# Patient Record
Sex: MALE | Race: WHITE | NOT HISPANIC OR LATINO | Employment: PART TIME | URBAN - METROPOLITAN AREA
[De-identification: names, ages, dates, MRNs, and addresses within clinical notes are randomized per-mention and may not be internally consistent; named-entity substitution may affect disease eponyms.]

---

## 2021-04-01 ENCOUNTER — CARE COORDINATION (OUTPATIENT)
Dept: CARDIOLOGY | Facility: CLINIC | Age: 51
End: 2021-04-01

## 2021-04-01 NOTE — PROGRESS NOTES
Referral for Outreach Clinic in [place]     Plan: Call pt and set up appt to see Dr. Sahni 4/14/21in [place].    Attempts to contact:  1. April 1, 2021 called VM is full  2. April 2, 2021 - No answer VM is full  3. April 5, 2021 appt scheduled.

## 2021-04-02 PROBLEM — F15.10 METHAMPHETAMINE ABUSE (H): Status: ACTIVE | Noted: 2021-04-02

## 2021-04-02 PROBLEM — I42.8 NICM (NONISCHEMIC CARDIOMYOPATHY) (H): Status: ACTIVE | Noted: 2021-02-21

## 2021-04-02 PROBLEM — E03.9 HYPOTHYROIDISM (ACQUIRED): Status: ACTIVE | Noted: 2018-08-31

## 2021-04-02 PROBLEM — I50.22 CHRONIC SYSTOLIC HEART FAILURE (H): Status: ACTIVE | Noted: 2021-03-01

## 2021-04-02 PROBLEM — E06.3 HASHIMOTO'S THYROIDITIS: Status: ACTIVE | Noted: 2018-08-31

## 2021-04-02 RX ORDER — TRIAMCINOLONE ACETONIDE 55 UG/1
1 SPRAY, METERED NASAL DAILY PRN
COMMUNITY
End: 2022-02-03

## 2021-04-02 RX ORDER — ALBUTEROL SULFATE 90 UG/1
2 AEROSOL, METERED RESPIRATORY (INHALATION) EVERY 4 HOURS PRN
COMMUNITY
End: 2022-08-08

## 2021-04-02 RX ORDER — ISOSORBIDE MONONITRATE 30 MG/1
0.5 TABLET, EXTENDED RELEASE ORAL DAILY
COMMUNITY
Start: 2021-03-02 | End: 2022-08-08

## 2021-04-02 RX ORDER — LEVOTHYROXINE SODIUM 175 UG/1
175 TABLET ORAL DAILY
COMMUNITY
Start: 2021-02-22 | End: 2022-02-03

## 2021-04-02 RX ORDER — FUROSEMIDE 40 MG
40 TABLET ORAL DAILY
COMMUNITY
Start: 2021-03-15 | End: 2022-02-03

## 2021-04-02 RX ORDER — METOPROLOL SUCCINATE 50 MG/1
50 TABLET, EXTENDED RELEASE ORAL 2 TIMES DAILY
COMMUNITY
Start: 2021-03-02 | End: 2022-08-08

## 2021-04-13 NOTE — PROGRESS NOTES
April 12, 2021     Dear Dr. Galdamez and Chetna,  I had the pleasure of seeing Nicolas Bustos in clinic today. As you know he is a 50yr old male with history of HFrEF, NICM, mild non-obstructive CAD and hypothyroidism. He was initially diagnosed with HF in 11/2019. EF at that time was 25-30% and angiogram showed minimal, non-obstructive CAD. EF remained low at 30%. ICD was apparently declined by patient at the time. Then he was admitted in 02/2021 with HF exacerbation secondary to non-compliance and being off medications for the previous 6 months. Echo showed LVEF of 30% and he was diuresed from 216 lbs to 206 lbs. Then he was readmitted 2/28-3/2/2021 with HF exacerbation. Was again diuresed from a weight of 215 lbs down to 203 lbs at the time of discharge. Repeat echo at he time showed further decrement in EF down to 10-15%. HF medications were restarted. Of note, UDS was positive for methamphetamines. He was seen in clinic recently and noted advanced Class III symptoms at the time in addition to bloating, fatigue and VEGAS to 1/2 block at the time. Weight was up to 214 lbs. He was scheduled to undergo ICD implantation on 4/13/21 yet this was postponed due to having been diagnosed with PNA and green sputum and with increased SOB. He was reportedly COVID negative in this setting. He is in the clinic to establish care for advanced heart failure.      Past Medical History:     CHF (congestive heart failure) ([facility]) 2019     Coronary artery disease     Hashimoto's disease     Hypothyroidism     Social History   Socioeconomic History     Marital status:    Spouse name: Quita Bustos     Number of children: Not on file     Years of education: Not on file     Highest education level: Not on file   Occupational History     Not on file   Social Needs     Financial resource strain: Not on file     Food insecurity   Worry: Not on file   Inability: Not on file     Transportation needs   Medical: Not on file    Non-medical: Not on file   Tobacco Use     Smoking status: Never Smoker     Smokeless tobacco: Current User   Types: Chew   Substance and Sexual Activity     Alcohol use: Yes   Frequency: Monthly or less   Drinks per session: 1 or 2   Binge frequency: Less than monthly     Drug use: Never     Sexual activity: Yes   Partners: Female   Lifestyle     Physical activity   Days per week: Not on file   Minutes per session: Not on file     Stress: Not on file   Relationships     Social connections   Talks on phone: Not on file   Gets together: Not on file   Attends Taoism service: Not on file   Active member of club or organization: Not on file   Attends meetings of clubs or organizations: Not on file   Relationship status: Not on file     Intimate partner violence   Fear of current or ex partner: Not on file   Emotionally abused: Not on file   Physically abused: Not on file   Forced sexual activity: Not on file   Other Topics Concern     Not on file   Social History Narrative     Not on file     Family History   Problem Relation Age of Onset     No Known Problems Mother     No Known Problems Father     Heart Disease Brother     Heart Disease Maternal Grandfather     CURRENT MEDICATIONS:  Current Outpatient Medications   Medication     albuterol (PROAIR HFA/PROVENTIL HFA/VENTOLIN HFA) 108 (90 Base) MCG/ACT inhaler     furosemide (LASIX) 40 MG tablet     isosorbide mononitrate (IMDUR) 30 MG 24 hr tablet     levothyroxine (SYNTHROID/LEVOTHROID) 175 MCG tablet     metoprolol succinate ER (TOPROL-XL) 50 MG 24 hr tablet     sacubitril-valsartan (ENTRESTO) 49-51 MG per tablet     triamcinolone (NASACORT) 55 MCG/ACT nasal aerosol     No current facility-administered medications for this visit.      ROS:   Constitutional: No fever, chills, or sweats.   ENT: No visual disturbance, ear ache, epistaxis, sore throat.   Allergies/Immunologic: Negative.   Respiratory: No cough, hemoptysis.   Cardiovascular: As per HPI.   GI: No  nausea, vomiting, hematemesis, melena, or hematochezia.   : No urinary frequency, dysuria, or hematuria.   Integument: Negative.   Psychiatric: Pleasant, no major depression noted  Neuro: No focal neurological deficits noted  Endocrinology: Negative.   Musculoskeletal: As per HPI.      EXAM:    General: appears comfortable, alert and oriented  Head: normocephalic, atraumatic  Eyes: anicteric sclera, EOMI , PERRL  Neck: no adenopathy  Orophyarynx: moist mucosa, no lesions noted  Heart: regular, S1/S2, no murmurs, rubs or gallop. Estimated JVP at 5 cmH2O  Lungs: CTAB, No wheezing.   Abdomen: soft, non-tender, bowel sounds present, no hepatosplenomegaly  Extremities: No LE edema today  Skin: no open lesions noted  Neuro: grossly non-focal     Labs:    Echo 2019: LVEF 25-30%; LVED 5.4 cm; 2+ MR  Echo 2019: LVEF 30-35%; LVEDd 5.8 cm; 2+ MR  Echo 2021: LVEF 30%, moderate MR  Echo 3/1/2021: LVEF 10-15%; LVEDd 6.2 cm; moderate MR   Left Ventricle: Severely reduced left ventricular systolic function.  The EF is visually estimated to be 10-15%.     Left Ventricle: There is mild concentric hypertrophy.     Right Ventricle: The right ventricle is mildly dilated.     Mitral Valve: There is moderate regurgitation.     Tricuspid Valve: There is mild regurgitation. RVSP measurin.8     Coronary angiogram 2019:  Minimal non-obstructive coronary artery disease  Non-ischemic cardiomyopathy     ASSESSMENT AND PLAN:  In summary, patient is a 50 year old male with above past medical history including NICM of unclear etiology, possibly related to metamphetamine use as well as history of medication non adherence who was referred to the HF clinc to establish care given advanced Class III symptoms and worsening EF at this time.    1. Chronic systolic heart failure. NICM. NYHA FC IIIb.   Beta-blocker: Continue Toprol XL 50 mg daily. Will up-titrate as tolerated. Plan to titrate ACEi first  ACEi: Entresto 49/51mg po  BID. Also has a cough (could be hypervolemia related) but if he needs to stop the entresto due to cost, recommend losartan then.   Aldosterone antagonist: None. Add once ACEi/BB better optimized   Volume status:   Diuretic regimen: lasix 80mg po daily. Labs and then likely will increase to BID.   Rhythm: Recent EKG with SR  Device therapy: pending  Ischemic assessment: Angiogram 11/2019 showed minimal non-obstructive CAD (10% pLAD, 20% p1st Diag, otherwise patent coronaries). Treat medically   Advanced therapies:      2. Mild non-obstructive CAD. Treat medically.   3. Hypothyroidism. On levothyroxine. Check labs.  4. Elevated LFTs. Noted on first admission in 02/2021, likely secondary to hepatic congestion vs low flow. Improving on subsequent labs. Will follow.     Educated on low sodium diet, daily weights, and exercise. Educated to call the HF Clinic for weight gain of 3-4 lbs overnight or 5 lbs in 1 week or increase shortness of breath or swelling. Diet and lifestyle modifications were discussed with the patient and a follow up plan is in place.            I appreciate the opportunity to participate in the care of Nicolas Bustos . Please do not hesitate to contact me with any further questions.    Sincerely,   Renzo Sahni MD     [facility] Division of Cardiology

## 2021-04-14 ENCOUNTER — OFFICE VISIT (OUTPATIENT)
Dept: CARDIOLOGY | Facility: OTHER | Age: 51
End: 2021-04-14
Payer: COMMERCIAL

## 2021-04-14 ENCOUNTER — VIRTUAL VISIT (OUTPATIENT)
Dept: CARDIOLOGY | Facility: OTHER | Age: 51
End: 2021-04-14
Payer: COMMERCIAL

## 2021-04-14 DIAGNOSIS — E78.2 MIXED HYPERLIPIDEMIA: ICD-10-CM

## 2021-04-14 DIAGNOSIS — I50.20 HEART FAILURE WITH REDUCED EJECTION FRACTION, NYHA CLASS III (H): Primary | ICD-10-CM

## 2021-04-14 DIAGNOSIS — I42.8 NONISCHEMIC CARDIOMYOPATHY (H): ICD-10-CM

## 2021-04-14 DIAGNOSIS — I10 BENIGN ESSENTIAL HYPERTENSION: ICD-10-CM

## 2021-04-14 DIAGNOSIS — Z53.9 ERRONEOUS ENCOUNTER--DISREGARD: Primary | ICD-10-CM

## 2021-04-14 PROCEDURE — 99204 OFFICE O/P NEW MOD 45 MIN: CPT | Mod: 95 | Performed by: INTERNAL MEDICINE

## 2021-04-14 NOTE — NURSING NOTE
Chief Complaint   Patient presents with     New Patient     New Heart Failure      Medications were reviewed.     Ayan Blanco CMA  Heart Failure, Advanced Heart Failure & CORE  Referral Specialist &     [facility]  Cardiology  Office: [phone]  [phone]

## 2021-04-14 NOTE — LETTER
4/14/2021      RE: Nicolas Bustos  [address] [place] [id number]       Dear Colleague,    Thank you for the opportunity to participate in the care of your patient, Nicolas Bustos, at the [facility] [place] at . Please see a copy of my visit note below.    April 12, 2021     Dear Dr. Galdamez and Chetna,  I had the pleasure of seeing Nicolas Bustos in clinic today. As you know he is a 50yr old male with history of HFrEF, NICM, mild non-obstructive CAD and hypothyroidism. He was initially diagnosed with HF in 11/2019. EF at that time was 25-30% and angiogram showed minimal, non-obstructive CAD. EF remained low at 30%. ICD was apparently declined by patient at the time. Then he was admitted in 02/2021 with HF exacerbation secondary to non-compliance and being off medications for the previous 6 months. Echo showed LVEF of 30% and he was diuresed from 216 lbs to 206 lbs. Then he was readmitted 2/28-3/2/2021 with HF exacerbation. Was again diuresed from a weight of 215 lbs down to 203 lbs at the time of discharge. Repeat echo at he time showed further decrement in EF down to 10-15%. HF medications were restarted. Of note, UDS was positive for methamphetamines. He was seen in clinic recently and noted advanced Class III symptoms at the time in addition to bloating, fatigue and VEGAS to 1/2 block at the time. Weight was up to 214 lbs. He was scheduled to undergo ICD implantation on 4/13/21 yet this was postponed due to having been diagnosed with PNA and green sputum and with increased SOB. He was reportedly COVID negative in this setting. He is in the clinic to establish care for advanced heart failure.      Past Medical History:     CHF (congestive heart failure) ([facility]) 2019     Coronary artery disease     Hashimoto's disease     Hypothyroidism     Social History   Socioeconomic History     Marital status:    Spouse name:  Quita Bustos     Number of children: Not on file     Years of education: Not on file     Highest education level: Not on file   Occupational History     Not on file   Social Needs     Financial resource strain: Not on file     Food insecurity   Worry: Not on file   Inability: Not on file     Transportation needs   Medical: Not on file   Non-medical: Not on file   Tobacco Use     Smoking status: Never Smoker     Smokeless tobacco: Current User   Types: Chew   Substance and Sexual Activity     Alcohol use: Yes   Frequency: Monthly or less   Drinks per session: 1 or 2   Binge frequency: Less than monthly     Drug use: Never     Sexual activity: Yes   Partners: Female   Lifestyle     Physical activity   Days per week: Not on file   Minutes per session: Not on file     Stress: Not on file   Relationships     Social connections   Talks on phone: Not on file   Gets together: Not on file   Attends Yarsanism service: Not on file   Active member of club or organization: Not on file   Attends meetings of clubs or organizations: Not on file   Relationship status: Not on file     Intimate partner violence   Fear of current or ex partner: Not on file   Emotionally abused: Not on file   Physically abused: Not on file   Forced sexual activity: Not on file   Other Topics Concern     Not on file   Social History Narrative     Not on file     Family History   Problem Relation Age of Onset     No Known Problems Mother     No Known Problems Father     Heart Disease Brother     Heart Disease Maternal Grandfather     CURRENT MEDICATIONS:  Current Outpatient Medications   Medication     albuterol (PROAIR HFA/PROVENTIL HFA/VENTOLIN HFA) 108 (90 Base) MCG/ACT inhaler     furosemide (LASIX) 40 MG tablet     isosorbide mononitrate (IMDUR) 30 MG 24 hr tablet     levothyroxine (SYNTHROID/LEVOTHROID) 175 MCG tablet     metoprolol succinate ER (TOPROL-XL) 50 MG 24 hr tablet     sacubitril-valsartan (ENTRESTO) 49-51 MG per tablet      triamcinolone (NASACORT) 55 MCG/ACT nasal aerosol     No current facility-administered medications for this visit.      ROS:   Constitutional: No fever, chills, or sweats.   ENT: No visual disturbance, ear ache, epistaxis, sore throat.   Allergies/Immunologic: Negative.   Respiratory: No cough, hemoptysis.   Cardiovascular: As per HPI.   GI: No nausea, vomiting, hematemesis, melena, or hematochezia.   : No urinary frequency, dysuria, or hematuria.   Integument: Negative.   Psychiatric: Pleasant, no major depression noted  Neuro: No focal neurological deficits noted  Endocrinology: Negative.   Musculoskeletal: As per HPI.      EXAM:    General: appears comfortable, alert and oriented  Head: normocephalic, atraumatic  Eyes: anicteric sclera, EOMI , PERRL  Neck: no adenopathy  Orophyarynx: moist mucosa, no lesions noted  Heart: regular, S1/S2, no murmurs, rubs or gallop. Estimated JVP at 5 cmH2O  Lungs: CTAB, No wheezing.   Abdomen: soft, non-tender, bowel sounds present, no hepatosplenomegaly  Extremities: No LE edema today  Skin: no open lesions noted  Neuro: grossly non-focal     Labs:    Echo 2019: LVEF 25-30%; LVED 5.4 cm; 2+ MR  Echo 2019: LVEF 30-35%; LVEDd 5.8 cm; 2+ MR  Echo 2021: LVEF 30%, moderate MR  Echo 3/1/2021: LVEF 10-15%; LVEDd 6.2 cm; moderate MR   Left Ventricle: Severely reduced left ventricular systolic function.  The EF is visually estimated to be 10-15%.     Left Ventricle: There is mild concentric hypertrophy.     Right Ventricle: The right ventricle is mildly dilated.     Mitral Valve: There is moderate regurgitation.     Tricuspid Valve: There is mild regurgitation. RVSP measurin.8     Coronary angiogram 2019:  Minimal non-obstructive coronary artery disease  Non-ischemic cardiomyopathy     ASSESSMENT AND PLAN:  In summary, patient is a 50 year old male with above past medical history including NICM of unclear etiology, possibly related to metamphetamine use as well  as history of medication non adherence who was referred to the HF clinc to establish care given advanced Class III symptoms and worsening EF at this time.    1. Chronic systolic heart failure. NICM. NYHA FC IIIb.   Beta-blocker: Continue Toprol XL 50 mg daily. Will up-titrate as tolerated. Plan to titrate ACEi first  ACEi: Entresto 49/51mg po BID. Also has a cough (could be hypervolemia related) but if he needs to stop the entresto due to cost, recommend losartan then.   Aldosterone antagonist: None. Add once ACEi/BB better optimized   Volume status:   Diuretic regimen: lasix 80mg po daily. Labs and then likely will increase to BID.   Rhythm: Recent EKG with SR  Device therapy: pending  Ischemic assessment: Angiogram 11/2019 showed minimal non-obstructive CAD (10% pLAD, 20% p1st Diag, otherwise patent coronaries). Treat medically   Advanced therapies:      2. Mild non-obstructive CAD. Treat medically.   3. Hypothyroidism. On levothyroxine. Check labs.  4. Elevated LFTs. Noted on first admission in 02/2021, likely secondary to hepatic congestion vs low flow. Improving on subsequent labs. Will follow.     Educated on low sodium diet, daily weights, and exercise. Educated to call the HF Clinic for weight gain of 3-4 lbs overnight or 5 lbs in 1 week or increase shortness of breath or swelling. Diet and lifestyle modifications were discussed with the patient and a follow up plan is in place.            I appreciate the opportunity to participate in the care of Nicolas Bustos . Please do not hesitate to contact me with any further questions.    Sincerely,   Renzo Sahni MD     [facility] Division of Cardiology           This encounter was opened in error. Please disregard.      Please do not hesitate to contact me if you have any questions/concerns.     Sincerely,     Renzo Sahni MD

## 2021-04-14 NOTE — PROGRESS NOTES
This was a phone encounter as patient was unable to make the phone agreement work.  Phone duration 17 minutes    April 14, 2021  Dear Dr. Galdamez and Nicolas Basilio Akash is a 50yr old male with history of HFrEF, NICM, mild non-obstructive CAD and hypothyroidism. He was initially diagnosed with HF in 11/2019. EF at that time was 25-30% and angiogram showed minimal, non-obstructive CAD. EF remained low at 30%. ICD was apparently declined by patient at the time. Then he was admitted in 02/2021 with HF exacerbation secondary to non-compliance and being off medications for the previous 6 months. Echo showed LVEF of 30% and he was diuresed from 216 lbs to 206 lbs. Then he was readmitted 2/28-3/2/2021 with HF exacerbation. Was again diuresed from a weight of 215 lbs down to 203 lbs at the time of discharge. Repeat echo at he time showed further decrement in EF down to 10-15%. HF medications were restarted. Of note, UDS was positive for methamphetamines. He was seen in clinic recently and noted advanced Class III symptoms at the time in addition to bloating, fatigue and VEGAS to 1/2 block at the time. Weight was up to 214 lbs. He was scheduled to undergo ICD implantation on 4/13/21 yet this was postponed due to having been diagnosed with PNA and green sputum and with increased SOB. He was reportedly COVID negative in this setting.  I am talking to him over the phone today.  Note that he is not doing very well especially in the setting of pneumonia.  He continues to have some productive cough and he finishes his antibiotic today.  He feels a little bit better however he still very short of breath.  He says his weight is about 210 to 214 pounds which is significantly above his baseline but less than when he was hospitalized.  He claims good medication compliance.  He also notes that he has been tested for Covid which was negative.  No other complaints    Past Medical History:     CHF (congestive heart failure) ([facility])  2019     Coronary artery disease     Hashimoto's disease     Hypothyroidism     Social History   Socioeconomic History     Marital status:    Spouse name: Quita Bustos     Number of children: Not on file     Years of education: Not on file     Highest education level: Not on file   Occupational History     Not on file   Social Needs     Financial resource strain: Not on file     Food insecurity   Worry: Not on file   Inability: Not on file     Transportation needs   Medical: Not on file   Non-medical: Not on file   Tobacco Use     Smoking status: Never Smoker     Smokeless tobacco: Current User   Types: Chew   Substance and Sexual Activity     Alcohol use: Yes   Frequency: Monthly or less   Drinks per session: 1 or 2   Binge frequency: Less than monthly     Drug use: Never     Sexual activity: Yes   Partners: Female   Lifestyle     Physical activity   Days per week: Not on file   Minutes per session: Not on file     Stress: Not on file   Relationships     Social connections   Talks on phone: Not on file   Gets together: Not on file   Attends Episcopalian service: Not on file   Active member of club or organization: Not on file   Attends meetings of clubs or organizations: Not on file   Relationship status: Not on file     Intimate partner violence   Fear of current or ex partner: Not on file   Emotionally abused: Not on file   Physically abused: Not on file   Forced sexual activity: Not on file   Other Topics Concern     Not on file   Social History Narrative     Not on file     Family History   Problem Relation Age of Onset     No Known Problems Mother     No Known Problems Father     Heart Disease Brother     Heart Disease Maternal Grandfather     CURRENT MEDICATIONS:  Current Outpatient Medications   Medication     albuterol (PROAIR HFA/PROVENTIL HFA/VENTOLIN HFA) 108 (90 Base) MCG/ACT inhaler     furosemide (LASIX) 40 MG tablet     isosorbide mononitrate (IMDUR) 30 MG 24 hr tablet     levothyroxine  (SYNTHROID/LEVOTHROID) 175 MCG tablet     metoprolol succinate ER (TOPROL-XL) 50 MG 24 hr tablet     sacubitril-valsartan (ENTRESTO) 49-51 MG per tablet     triamcinolone (NASACORT) 55 MCG/ACT nasal aerosol     No current facility-administered medications for this visit.      ROS:   Constitutional: No fever, chills, or sweats.   ENT: No visual disturbance, ear ache, epistaxis, sore throat.   Allergies/Immunologic: Negative.   Respiratory: No cough, hemoptysis.   Cardiovascular: As per HPI.   GI: No nausea, vomiting, hematemesis, melena, or hematochezia.   : No urinary frequency, dysuria, or hematuria.   Integument: Negative.   Psychiatric: Pleasant, no major depression noted  Neuro: No focal neurological deficits noted  Endocrinology: Negative.   Musculoskeletal: As per HPI.      EXAM:  Unable to fully evaluate the patient given that this was a phone encounter.  Vitals are unknown to patient.     Labs: reviewed  Echo 2019: LVEF 25-30%; LVED 5.4 cm; 2+ MR  Echo 2019: LVEF 30-35%; LVEDd 5.8 cm; 2+ MR  Echo 2021: LVEF 30%, moderate MR  Echo 3/1/2021: LVEF 10-15%; LVEDd 6.2 cm; moderate MR   Left Ventricle: Severely reduced left ventricular systolic function.  The EF is visually estimated to be 10-15%.     Left Ventricle: There is mild concentric hypertrophy.     Right Ventricle: The right ventricle is mildly dilated.     Mitral Valve: There is moderate regurgitation.     Tricuspid Valve: There is mild regurgitation. RVSP measurin.8     Coronary angiogram 2019:  Minimal non-obstructive coronary artery disease  Non-ischemic cardiomyopathy    ASSESSMENT AND PLAN:  In summary, patient is a 50 year old male with above past medical history including NICM of unclear etiology, possibly related to metamphetamine use as well as history of medication non adherence who was referred to the HF clinc to establish care given advanced Class III symptoms and worsening EF at this time.  I think he has multiple  things going on even though his Covid test was negative upon just talking to him I am not sure if it is really negative and I would suggest follow-up Covid test for him.  I am not sure the antibiotic have him tremendously but he notes that he feels a little bit better.  Given that he is has ongoing cough and weight gain I suggested that he increase his Lasix to 80 mg in the morning of continue 40 mg in the afternoon for at least 3 days and see how he is doing.  If he is doing better with this then we might suggest 2 weeks standard take the 80 mg / 40 mg for longer time and see how he is doing.  Given that I was unable to see him this time I discussed with him that I will see him next time when I am here back in June or so.  Obviously seizing illicit use would be of critical importance in order to help him recover his ejection fraction.    1. Chronic systolic heart failure. NICM. NYHA FC IIIb.   Beta-blocker: Continue Toprol XL 50 mg daily. Will up-titrate as tolerated. Plan to titrate ACEi first  ACEi: Entresto 49/51mg po BID. Also has a cough (could be hypervolemia related) but if he needs to stop the entresto due to cost, recommend losartan then.   Aldosterone antagonist: None. Add once ACEi/BB better optimized   Volume status: Sounds mildly volume overloaded  Diuretic regimen: Recommended to increase dose to 80 mg in the morning and 40 mg in the afternoon for about next couple of days and then continue 40 mg twice daily.  Might need to increase it permanently  Rhythm: Recent EKG with SR  Device therapy: pending  Ischemic assessment: Angiogram 11/2019 showed minimal non-obstructive CAD (10% pLAD, 20% p1st Diag, otherwise patent coronaries). Treat medically   Advanced therapies:      2. Mild non-obstructive CAD. Treat medically.   3. Hypothyroidism. On levothyroxine. Check labs.  4. Elevated LFTs. Noted on first admission in 02/2021, likely secondary to hepatic congestion vs low flow. Improving on subsequent labs.  Will follow.     I appreciate the opportunity to participate in the care of Nicolas Bustos . Please do not hesitate to contact me with any further questions.    Sincerely,   Renzo Sahni MD     [facility] Division of Cardiology

## 2021-04-14 NOTE — LETTER
4/14/2021      RE: Nicolas Bustos  [address] [facility] [address]       Dear Colleague,    Thank you for the opportunity to participate in the care of your patient, Nicolas Bustos, at the [facility] HEART SERVICES [place] at [facility]. Please see a copy of my visit note below.    This was a phone encounter as patient was unable to make the phone agreement work.  Phone duration 17 minutes    April 14, 2021  Dear Dr. Galdamez and Chetna  Nicolas Bustos is a 50yr old male with history of HFrEF, NICM, mild non-obstructive CAD and hypothyroidism. He was initially diagnosed with HF in 11/2019. EF at that time was 25-30% and angiogram showed minimal, non-obstructive CAD. EF remained low at 30%. ICD was apparently declined by patient at the time. Then he was admitted in 02/2021 with HF exacerbation secondary to non-compliance and being off medications for the previous 6 months. Echo showed LVEF of 30% and he was diuresed from 216 lbs to 206 lbs. Then he was readmitted 2/28-3/2/2021 with HF exacerbation. Was again diuresed from a weight of 215 lbs down to 203 lbs at the time of discharge. Repeat echo at he time showed further decrement in EF down to 10-15%. HF medications were restarted. Of note, UDS was positive for methamphetamines. He was seen in clinic recently and noted advanced Class III symptoms at the time in addition to bloating, fatigue and VEGAS to 1/2 block at the time. Weight was up to 214 lbs. He was scheduled to undergo ICD implantation on 4/13/21 yet this was postponed due to having been diagnosed with PNA and green sputum and with increased SOB. He was reportedly COVID negative in this setting.  I am talking to him over the phone today.  Note that he is not doing very well especially in the setting of pneumonia.  He continues to have some productive cough and he finishes his antibiotic today.  He feels a little bit better however he still  very short of breath.  He says his weight is about 210 to 214 pounds which is significantly above his baseline but less than when he was hospitalized.  He claims good medication compliance.  He also notes that he has been tested for Covid which was negative.  No other complaints    Past Medical History:     CHF (congestive heart failure) ([facility]) 2019     Coronary artery disease     Hashimoto's disease     Hypothyroidism     Social History   Socioeconomic History     Marital status:    Spouse name: Quita Bustos     Number of children: Not on file     Years of education: Not on file     Highest education level: Not on file   Occupational History     Not on file   Social Needs     Financial resource strain: Not on file     Food insecurity   Worry: Not on file   Inability: Not on file     Transportation needs   Medical: Not on file   Non-medical: Not on file   Tobacco Use     Smoking status: Never Smoker     Smokeless tobacco: Current User   Types: Chew   Substance and Sexual Activity     Alcohol use: Yes   Frequency: Monthly or less   Drinks per session: 1 or 2   Binge frequency: Less than monthly     Drug use: Never     Sexual activity: Yes   Partners: Female   Lifestyle     Physical activity   Days per week: Not on file   Minutes per session: Not on file     Stress: Not on file   Relationships     Social connections   Talks on phone: Not on file   Gets together: Not on file   Attends Jew service: Not on file   Active member of club or organization: Not on file   Attends meetings of clubs or organizations: Not on file   Relationship status: Not on file     Intimate partner violence   Fear of current or ex partner: Not on file   Emotionally abused: Not on file   Physically abused: Not on file   Forced sexual activity: Not on file   Other Topics Concern     Not on file   Social History Narrative     Not on file     Family History   Problem Relation Age of Onset     No Known Problems Mother     No Known  Problems Father     Heart Disease Brother     Heart Disease Maternal Grandfather     CURRENT MEDICATIONS:  Current Outpatient Medications   Medication     albuterol (PROAIR HFA/PROVENTIL HFA/VENTOLIN HFA) 108 (90 Base) MCG/ACT inhaler     furosemide (LASIX) 40 MG tablet     isosorbide mononitrate (IMDUR) 30 MG 24 hr tablet     levothyroxine (SYNTHROID/LEVOTHROID) 175 MCG tablet     metoprolol succinate ER (TOPROL-XL) 50 MG 24 hr tablet     sacubitril-valsartan (ENTRESTO) 49-51 MG per tablet     triamcinolone (NASACORT) 55 MCG/ACT nasal aerosol     No current facility-administered medications for this visit.      ROS:   Constitutional: No fever, chills, or sweats.   ENT: No visual disturbance, ear ache, epistaxis, sore throat.   Allergies/Immunologic: Negative.   Respiratory: No cough, hemoptysis.   Cardiovascular: As per HPI.   GI: No nausea, vomiting, hematemesis, melena, or hematochezia.   : No urinary frequency, dysuria, or hematuria.   Integument: Negative.   Psychiatric: Pleasant, no major depression noted  Neuro: No focal neurological deficits noted  Endocrinology: Negative.   Musculoskeletal: As per HPI.      EXAM:  Unable to fully evaluate the patient given that this was a phone encounter.  Vitals are unknown to patient.     Labs: reviewed  Echo 2019: LVEF 25-30%; LVED 5.4 cm; 2+ MR  Echo 2019: LVEF 30-35%; LVEDd 5.8 cm; 2+ MR  Echo 2021: LVEF 30%, moderate MR  Echo 3/1/2021: LVEF 10-15%; LVEDd 6.2 cm; moderate MR   Left Ventricle: Severely reduced left ventricular systolic function.  The EF is visually estimated to be 10-15%.     Left Ventricle: There is mild concentric hypertrophy.     Right Ventricle: The right ventricle is mildly dilated.     Mitral Valve: There is moderate regurgitation.     Tricuspid Valve: There is mild regurgitation. RVSP measurin.8     Coronary angiogram 2019:  Minimal non-obstructive coronary artery disease  Non-ischemic cardiomyopathy    ASSESSMENT AND  PLAN:  In summary, patient is a 50 year old male with above past medical history including NICM of unclear etiology, possibly related to metamphetamine use as well as history of medication non adherence who was referred to the HF clinc to establish care given advanced Class III symptoms and worsening EF at this time.  I think he has multiple things going on even though his Covid test was negative upon just talking to him I am not sure if it is really negative and I would suggest follow-up Covid test for him.  I am not sure the antibiotic have him tremendously but he notes that he feels a little bit better.  Given that he is has ongoing cough and weight gain I suggested that he increase his Lasix to 80 mg in the morning of continue 40 mg in the afternoon for at least 3 days and see how he is doing.  If he is doing better with this then we might suggest 2 weeks standard take the 80 mg / 40 mg for longer time and see how he is doing.  Given that I was unable to see him this time I discussed with him that I will see him next time when I am here back in June or so.  Obviously seizing illicit use would be of critical importance in order to help him recover his ejection fraction.    1. Chronic systolic heart failure. NICM. NYHA FC IIIb.   Beta-blocker: Continue Toprol XL 50 mg daily. Will up-titrate as tolerated. Plan to titrate ACEi first  ACEi: Entresto 49/51mg po BID. Also has a cough (could be hypervolemia related) but if he needs to stop the entresto due to cost, recommend losartan then.   Aldosterone antagonist: None. Add once ACEi/BB better optimized   Volume status: Sounds mildly volume overloaded  Diuretic regimen: Recommended to increase dose to 80 mg in the morning and 40 mg in the afternoon for about next couple of days and then continue 40 mg twice daily.  Might need to increase it permanently  Rhythm: Recent EKG with SR  Device therapy: pending  Ischemic assessment: Angiogram 11/2019 showed minimal  non-obstructive CAD (10% pLAD, 20% p1st Diag, otherwise patent coronaries). Treat medically   Advanced therapies:      2. Mild non-obstructive CAD. Treat medically.   3. Hypothyroidism. On levothyroxine. Check labs.  4. Elevated LFTs. Noted on first admission in 02/2021, likely secondary to hepatic congestion vs low flow. Improving on subsequent labs. Will follow.     I appreciate the opportunity to participate in the care of Nicolas Bustos . Please do not hesitate to contact me with any further questions.    Sincerely,   Renzo Sahni MD     [facility] Division of Cardiology

## 2022-02-03 RX ORDER — MILRINONE LACTATE 0.2 MG/ML
49.2 INJECTION, SOLUTION INTRAVENOUS
COMMUNITY
Start: 2022-02-03 | End: 2022-04-08

## 2022-02-03 RX ORDER — DIGOXIN 250 MCG
0.25 TABLET ORAL
COMMUNITY
Start: 2022-02-03 | End: 2022-05-04

## 2022-02-03 RX ORDER — FUROSEMIDE 40 MG
40 TABLET ORAL 2 TIMES DAILY
COMMUNITY
Start: 2022-02-03 | End: 2022-04-08

## 2022-02-03 RX ORDER — LEVOTHYROXINE SODIUM 150 UG/1
150 TABLET ORAL DAILY
COMMUNITY
Start: 2022-01-13 | End: 2022-04-13

## 2022-02-03 RX ORDER — TRIAMCINOLONE ACETONIDE 55 UG/1
1 SPRAY, METERED NASAL DAILY
COMMUNITY

## 2022-02-03 RX ORDER — OMEPRAZOLE 40 MG/1
40 CAPSULE, DELAYED RELEASE ORAL DAILY
COMMUNITY
Start: 2022-02-04

## 2022-02-16 ENCOUNTER — OFFICE VISIT (OUTPATIENT)
Dept: CARDIOLOGY | Facility: OTHER | Age: 52
End: 2022-02-16
Payer: COMMERCIAL

## 2022-02-16 DIAGNOSIS — I50.20 HEART FAILURE WITH REDUCED EJECTION FRACTION, NYHA CLASS III (H): ICD-10-CM

## 2022-02-16 DIAGNOSIS — I42.8 NONISCHEMIC CARDIOMYOPATHY (H): ICD-10-CM

## 2022-02-16 DIAGNOSIS — I10 BENIGN ESSENTIAL HYPERTENSION: Primary | ICD-10-CM

## 2022-02-16 DIAGNOSIS — I48.0 PAROXYSMAL ATRIAL FIBRILLATION (H): ICD-10-CM

## 2022-02-16 DIAGNOSIS — I50.84 ACC/AHA STAGE D HEART FAILURE (H): ICD-10-CM

## 2022-02-16 PROCEDURE — 99215 OFFICE O/P EST HI 40 MIN: CPT | Performed by: INTERNAL MEDICINE

## 2022-02-16 NOTE — LETTER
2/16/2022      RE: Nicolas Bustos  [address] [facility] [address]       Dear Colleague,    Thank you for the opportunity to participate in the care of your patient, Nicolas Bustos, at the [facility] HEART SERVICES [place] at [facility]. Please see a copy of my visit note below.    February 16, 2022     Dear Dr. Galdamez and Chetna,  I had the pleasure of seeing Nicolas Bustos in clinic today. As you know he is a 51yr old male with of HFrEF, NICM, mild non-obstructive CAD and hypothyroidism. He was initially diagnosed with HF in 11/2019. EF at that time was 25-30% and angiogram showed minimal, non-obstructive CAD. EF remained low at 30%. ICD was apparently declined by patient at the time. Then he was admitted in 02/2021 with HF exacerbation secondary to non-compliance and being off medications for the previous 6 months. Echo showed LVEF of 30% and he was diuresed from 216 lbs to 206 lbs. Then he was readmitted 2/28-3/2/2021 with HF exacerbation. Was again diuresed from a weight of 215 lbs down to 203 lbs at the time of discharge. Repeat echo at he time showed further decrement in EF down to 10-15%. HF medications were restarted. Of note, UDS was positive for methamphetamines. He was seen in clinic recently and noted advanced Class III symptoms at the time in addition to bloating, fatigue and VEGAS to 1/2 block at the time. Weight was up to 214 lbs. He was scheduled to undergo ICD implantation on 4/13/21 yet this was postponed due to having been diagnosed with PNA. I saw him once back in 4/2021.   He did have several hospital admissions, most recently on 1/31/2021 for n/v and increased sob. Wt at home 202-203 lb. BNP 2763 on admission.  Where he was initially discharged on 0.125 of milrinone he had significant symptoms of the way home came back and milrinone was increased to 0.5 mcg/kg/min. 2/1/2022 echo showed decrement in LVEF to 10-15%, RV  moderately dilated, mod MR, LVEDd 6 cm. Underwent a RHC 2/1/2022 showed Adolfo CI 1.7, PW 26. Complicated by atrial tachycardia during procedure with rates up to 130s thus started on digoxin load. Diuresed 5.4 L during admission with d/c wt 198 lb. Continued milrinone at 0.5 mcg/kg/min and lasix 40 mg twice daily at discharge.   Today we had a man and very long discussion with him and his wife in [place].  We discussed that he is expected survival is extremely limited potentially in my opinion given that he is requiring 0.5 mcg/min of milrinone support.  We do not know how long this is going to large but statistically his survival is 6 months or less.  He understands this and very worried.  Overall notes that he feels significantly better after increasing the milrinone and overall able to do much more at home.  He has more energy which is not unexpected.  Denies any dizziness lightheadedness nausea his appetite is good and does not retain any fluid in the lower extremities may be some in the abdomen.  He tells me that he has not been using any drugs including methamphetamine since December 2021.  Also he did quit chewing tobacco and he has been free of any drugs since December 2021.  He notes that he just realizes health is very important for him and as such she will focus on it and not use any drugs.  But he readily admits to relapsing December.  Overall no other complaints PICC line is in place dressing is intact    Past Medical History:     CHF [facility]congestive heart failure) ([facility] 2019     Coronary artery disease     Hashimoto's disease     Hypothyroidism     Social History   Socioeconomic History     Marital status:    Spouse name: Quita Bustos     Number of children: Not on file     Years of education: Not on file     Highest education level: Not on file   Occupational History     Not on file   Social Needs     Financial resource strain: Not on file     Food insecurity   Worry: Not on file    Inability: Not on file     Transportation needs   Medical: Not on file   Non-medical: Not on file   Tobacco Use     Smoking status: Never Smoker     Smokeless tobacco: Current User   Types: Chew   Substance and Sexual Activity     Alcohol use: Yes   Frequency: Monthly or less   Drinks per session: 1 or 2   Binge frequency: Less than monthly     Drug use: Never     Sexual activity: Yes   Partners: Female   Lifestyle     Physical activity   Days per week: Not on file   Minutes per session: Not on file     Stress: Not on file   Relationships     Social connections   Talks on phone: Not on file   Gets together: Not on file   Attends Yarsanism service: Not on file   Active member of club or organization: Not on file   Attends meetings of clubs or organizations: Not on file   Relationship status: Not on file     Intimate partner violence   Fear of current or ex partner: Not on file   Emotionally abused: Not on file   Physically abused: Not on file   Forced sexual activity: Not on file   Other Topics Concern     Not on file   Social History Narrative     Not on file     Family History   Problem Relation Age of Onset     No Known Problems Mother     No Known Problems Father     Heart Disease Brother     Heart Disease Maternal Grandfather     SOCIAL HISTORY:  Social History     Socioeconomic History     Marital status: Not on file     Spouse name: Not on file     Number of children: Not on file     Years of education: Not on file     Highest education level: Not on file   Occupational History     Not on file   Tobacco Use     Smoking status: Not on file     Smokeless tobacco: Not on file   Substance and Sexual Activity     Alcohol use: Not on file     Drug use: Not on file     Sexual activity: Not on file   Other Topics Concern     Not on file   Social History Narrative     Not on file     Social Determinants of Health     Financial Resource Strain: Not on file   Food Insecurity: Not on file   Transportation Needs: Not on  file   Physical Activity: Not on file   Stress: Not on file   Social Connections: Not on file   Intimate Partner Violence: Not on file   Housing Stability: Not on file     CURRENT MEDICATIONS:  Current Outpatient Medications   Medication     albuterol (PROAIR HFA/PROVENTIL HFA/VENTOLIN HFA) 108 (90 Base) MCG/ACT inhaler     apixaban ANTICOAGULANT (ELIQUIS) 5 MG tablet     aspirin (ASA) 81 MG EC tablet     digoxin (LANOXIN) 250 MCG tablet     furosemide (LASIX) 40 MG tablet     isosorbide mononitrate (IMDUR) 30 MG 24 hr tablet     levothyroxine (SYNTHROID/LEVOTHROID) 150 MCG tablet     magnesium oxide (MAG-OX) 400 (241.3 Mg) MG tablet     metoprolol succinate ER (TOPROL-XL) 50 MG 24 hr tablet     milrinone (PRIMACOR) infusion 200 mcg/mL PREMIX     omeprazole (PRILOSEC) 40 MG DR capsule     sacubitril-valsartan (ENTRESTO) 49-51 MG per tablet     triamcinolone (NASACORT) 55 MCG/ACT nasal aerosol     No current facility-administered medications for this visit.     ROS:   Constitutional: No fever, chills, or sweats.   ENT: No visual disturbance, ear ache, epistaxis, sore throat.   Allergies/Immunologic: Negative.   Respiratory: No cough, hemoptysis.   Cardiovascular: As per HPI.   GI: No nausea, vomiting, hematemesis, melena, or hematochezia.   : No urinary frequency, dysuria, or hematuria.   Integument: Negative.   Psychiatric: Pleasant, no major depression noted  Neuro: No focal neurological deficits noted  Endocrinology: Negative.   Musculoskeletal: As per HPI.      EXAM:  Blood pressure today in clinic was 116/64 mmHg weight was 211 pounds and temperature was 98.4 Fahrenheit  General: appears comfortable, alert and oriented  Head: normocephalic, atraumatic  Eyes: anicteric sclera, EOMI , PERRL  Neck: no adenopathy  Orophyarynx: moist mucosa, no lesions noted  Heart: regular, S1/S2, no murmurs, rubs or gallop. Estimated JVP at 5 cmH2O  Lungs: CTAB, No wheezing.   Abdomen: soft, non-tender, bowel sounds present, no  hepatosplenomegaly  Extremities: No LE edema today.  PICC line is in place and dressing is intact  Skin: no open lesions noted  Neuro: grossly non-focal     Echo 2019: LVEF 25-30%; LVED 5.4 cm; 2+ MR  Echo 2019: LVEF 30-35%; LVEDd 5.8 cm; 2+ MR  Echo 2021: LVEF 30%, moderate MR  Echo 3/1/2021: LVEF 10-15%; LVEDd 6.2 cm; moderate MR   Left Ventricle: Severely reduced left ventricular systolic function.  The EF is visually estimated to be 10-15%.     Left Ventricle: There is mild concentric hypertrophy.     Right Ventricle: The right ventricle is mildly dilated.     Mitral Valve: There is moderate regurgitation.     Tricuspid Valve: There is mild regurgitation. RVSP measurin.8      Coronary angiogram 2019:  Minimal non-obstructive coronary artery disease  Non-ischemic cardiomyopathy    TTE 22:     Left Ventricle: The left ventricle is mildly dilated. Wall thickness is   normal. Severely reduced left ventricular systolic function. The EF is   visually estimated to be 10-15%.      Right Ventricle: The right ventricle is moderately dilated. Systolic   function is moderate-to-severely reduced.      Mitral Valve: There is moderate regurgitation.      IVC/SVC: Dilated IVC with minimal respirophasic changes.     ASSESSMENT AND PLAN:  In summary, patient is a 51 year old male with above past medical history including NICM most likely related to ongoing metamphetamine use as well as history of medication non adherence who was referred to the  clinc for further evaluation.  He tells me that he has not been using any drugs since 2021 and he has been very compliant with his medications.  He just tolerate Entresto yesterday half of the 24 mg tablets which he tolerated well so far.  He is also using the milrinone uninterrupted.  And taking care of his PICC site as needed.  He tells me that he would like to proceed with advanced therapy evaluation as he will stay off of any drugs in the  future.     1. Chronic systolic heart failure. NICM. NYHA class III, stage D, inotrope dependent   at this time patient is inotrope dependent and requires the high dose milrinone use at 0.5 mcg/min.  This portends a poor prognosis potentially 6 months or less.  We will not change any of his medications today as he just tolerated take Entresto yesterday which is dosed at 12.5 mg twice daily which he takes.  In addition he is taking 40 mg of Lasix twice a day he also takes his digoxin as well as the apixaban without any complications.  Again given the recent change to Entresto we will not adjust medications.  We did have a long discussion with regards to advanced heart failure therapy eligibility.  We, at this time, can establish that he is not a heart transplant candidate given longstanding and heavy methamphetamine use.  He tells me that he did quit in December and he did indeed have a negative drug test in January 2022.  Given his very limited survival which I again estimate less than 6 months given the need for high dose milrinone use I agreed that we are going to have insurance clearance obtained and we will have our social work team and neuropsychology team talk to him to establish how long he needs to stay off every single drug in order to be considered at least for LVAD.  Please note that he does have a PICC line now for milrinone administration.  I did request insurance clearance I will get back to them in 2 to teams as soon as I have clearance.  They are willing to come to [place] at any time and complete an evaluation as needed.  They understand that they need to stay in [place] for at least a month should be approved for him for LVAD.  They understand that the chances are relatively low at this time.     2. Mild non-obstructive CAD. Treat medically.   3. Hypothyroidism. On levothyroxine. Check labs.  4. Elevated LFTs. Noted on first admission in 02/2021, likely secondary to hepatic congestion vs  low flow. Improving on subsequent labs. Will follow.       I appreciate the opportunity to participate in the care of Nicolas Bustos . Please do not hesitate to contact me with any further questions.         Please do not hesitate to contact me if you have any questions/concerns.     Sincerely,     Renzo Sahni MD

## 2022-02-16 NOTE — NURSING NOTE
Chief Complaint   Patient presents with     Follow Up     Heart Failure     Medications reviewed. Vitals taken by Lei BAH.     Ayan Blanco CMA

## 2022-02-16 NOTE — PROGRESS NOTES
February 16, 2022     Dear Dr. Galdamez and Chetna,  I had the pleasure of seeing Nicolas Bustos in clinic today. As you know he is a 51yr old male with of HFrEF, NICM, mild non-obstructive CAD and hypothyroidism. He was initially diagnosed with HF in 11/2019. EF at that time was 25-30% and angiogram showed minimal, non-obstructive CAD. EF remained low at 30%. ICD was apparently declined by patient at the time. Then he was admitted in 02/2021 with HF exacerbation secondary to non-compliance and being off medications for the previous 6 months. Echo showed LVEF of 30% and he was diuresed from 216 lbs to 206 lbs. Then he was readmitted 2/28-3/2/2021 with HF exacerbation. Was again diuresed from a weight of 215 lbs down to 203 lbs at the time of discharge. Repeat echo at he time showed further decrement in EF down to 10-15%. HF medications were restarted. Of note, UDS was positive for methamphetamines. He was seen in clinic recently and noted advanced Class III symptoms at the time in addition to bloating, fatigue and VEGAS to 1/2 block at the time. Weight was up to 214 lbs. He was scheduled to undergo ICD implantation on 4/13/21 yet this was postponed due to having been diagnosed with PNA. I saw him once back in 4/2021.   He did have several hospital admissions, most recently on 1/31/2021 for n/v and increased sob. Wt at home 202-203 lb. BNP 2763 on admission.  Where he was initially discharged on 0.125 of milrinone he had significant symptoms of the way home came back and milrinone was increased to 0.5 mcg/kg/min. 2/1/2022 echo showed decrement in LVEF to 10-15%, RV moderately dilated, mod MR, LVEDd 6 cm. Underwent a RHC 2/1/2022 showed Adolfo CI 1.7, PW 26. Complicated by atrial tachycardia during procedure with rates up to 130s thus started on digoxin load. Diuresed 5.4 L during admission with d/c wt 198 lb. Continued milrinone at 0.5 mcg/kg/min and lasix 40 mg twice daily at discharge.   Today we had a man and very  long discussion with him and his wife in [place].  We discussed that he is expected survival is extremely limited potentially in my opinion given that he is requiring 0.5 mcg/min of milrinone support.  We do not know how long this is going to large but statistically his survival is 6 months or less.  He understands this and very worried.  Overall notes that he feels significantly better after increasing the milrinone and overall able to do much more at home.  He has more energy which is not unexpected.  Denies any dizziness lightheadedness nausea his appetite is good and does not retain any fluid in the lower extremities may be some in the abdomen.  He tells me that he has not been using any drugs including methamphetamine since December 2021.  Also he did quit chewing tobacco and he has been free of any drugs since December 2021.  He notes that he just realizes health is very important for him and as such she will focus on it and not use any drugs.  But he readily admits to relapsing December.  Overall no other complaints PICC line is in place dressing is intact    Past Medical History:     CHF (congestive heart failure) ([facility]) 2019     Coronary artery disease     Hashimoto's disease     Hypothyroidism     Social History   Socioeconomic History     Marital status:    Spouse name: Quita Bustos     Number of children: Not on file     Years of education: Not on file     Highest education level: Not on file   Occupational History     Not on file   Social Needs     Financial resource strain: Not on file     Food insecurity   Worry: Not on file   Inability: Not on file     Transportation needs   Medical: Not on file   Non-medical: Not on file   Tobacco Use     Smoking status: Never Smoker     Smokeless tobacco: Current User   Types: Chew   Substance and Sexual Activity     Alcohol use: Yes   Frequency: Monthly or less   Drinks per session: 1 or 2   Binge frequency: Less than monthly     Drug use: Never      Sexual activity: Yes   Partners: Female   Lifestyle     Physical activity   Days per week: Not on file   Minutes per session: Not on file     Stress: Not on file   Relationships     Social connections   Talks on phone: Not on file   Gets together: Not on file   Attends Judaism service: Not on file   Active member of club or organization: Not on file   Attends meetings of clubs or organizations: Not on file   Relationship status: Not on file     Intimate partner violence   Fear of current or ex partner: Not on file   Emotionally abused: Not on file   Physically abused: Not on file   Forced sexual activity: Not on file   Other Topics Concern     Not on file   Social History Narrative     Not on file     Family History   Problem Relation Age of Onset     No Known Problems Mother     No Known Problems Father     Heart Disease Brother     Heart Disease Maternal Grandfather     SOCIAL HISTORY:  Social History     Socioeconomic History     Marital status: Not on file     Spouse name: Not on file     Number of children: Not on file     Years of education: Not on file     Highest education level: Not on file   Occupational History     Not on file   Tobacco Use     Smoking status: Not on file     Smokeless tobacco: Not on file   Substance and Sexual Activity     Alcohol use: Not on file     Drug use: Not on file     Sexual activity: Not on file   Other Topics Concern     Not on file   Social History Narrative     Not on file     Social Determinants of Health     Financial Resource Strain: Not on file   Food Insecurity: Not on file   Transportation Needs: Not on file   Physical Activity: Not on file   Stress: Not on file   Social Connections: Not on file   Intimate Partner Violence: Not on file   Housing Stability: Not on file     CURRENT MEDICATIONS:  Current Outpatient Medications   Medication     albuterol (PROAIR HFA/PROVENTIL HFA/VENTOLIN HFA) 108 (90 Base) MCG/ACT inhaler     apixaban ANTICOAGULANT (ELIQUIS) 5 MG  tablet     aspirin (ASA) 81 MG EC tablet     digoxin (LANOXIN) 250 MCG tablet     furosemide (LASIX) 40 MG tablet     isosorbide mononitrate (IMDUR) 30 MG 24 hr tablet     levothyroxine (SYNTHROID/LEVOTHROID) 150 MCG tablet     magnesium oxide (MAG-OX) 400 (241.3 Mg) MG tablet     metoprolol succinate ER (TOPROL-XL) 50 MG 24 hr tablet     milrinone (PRIMACOR) infusion 200 mcg/mL PREMIX     omeprazole (PRILOSEC) 40 MG DR capsule     sacubitril-valsartan (ENTRESTO) 49-51 MG per tablet     triamcinolone (NASACORT) 55 MCG/ACT nasal aerosol     No current facility-administered medications for this visit.     ROS:   Constitutional: No fever, chills, or sweats.   ENT: No visual disturbance, ear ache, epistaxis, sore throat.   Allergies/Immunologic: Negative.   Respiratory: No cough, hemoptysis.   Cardiovascular: As per HPI.   GI: No nausea, vomiting, hematemesis, melena, or hematochezia.   : No urinary frequency, dysuria, or hematuria.   Integument: Negative.   Psychiatric: Pleasant, no major depression noted  Neuro: No focal neurological deficits noted  Endocrinology: Negative.   Musculoskeletal: As per HPI.      EXAM:  Blood pressure today in clinic was 116/64 mmHg weight was 211 pounds and temperature was 98.4 Fahrenheit  General: appears comfortable, alert and oriented  Head: normocephalic, atraumatic  Eyes: anicteric sclera, EOMI , PERRL  Neck: no adenopathy  Orophyarynx: moist mucosa, no lesions noted  Heart: regular, S1/S2, no murmurs, rubs or gallop. Estimated JVP at 5 cmH2O  Lungs: CTAB, No wheezing.   Abdomen: soft, non-tender, bowel sounds present, no hepatosplenomegaly  Extremities: No LE edema today.  PICC line is in place and dressing is intact  Skin: no open lesions noted  Neuro: grossly non-focal     Echo 11/2019: LVEF 25-30%; LVED 5.4 cm; 2+ MR  Echo 12/2019: LVEF 30-35%; LVEDd 5.8 cm; 2+ MR  Echo 02/21/2021: LVEF 30%, moderate MR  Echo 3/1/2021: LVEF 10-15%; LVEDd 6.2 cm; moderate MR   Left Ventricle:  Severely reduced left ventricular systolic function.  The EF is visually estimated to be 10-15%.     Left Ventricle: There is mild concentric hypertrophy.     Right Ventricle: The right ventricle is mildly dilated.     Mitral Valve: There is moderate regurgitation.     Tricuspid Valve: There is mild regurgitation. RVSP measurin.8      Coronary angiogram 2019:  Minimal non-obstructive coronary artery disease  Non-ischemic cardiomyopathy    TTE 22:     Left Ventricle: The left ventricle is mildly dilated. Wall thickness is   normal. Severely reduced left ventricular systolic function. The EF is   visually estimated to be 10-15%.      Right Ventricle: The right ventricle is moderately dilated. Systolic   function is moderate-to-severely reduced.      Mitral Valve: There is moderate regurgitation.      IVC/SVC: Dilated IVC with minimal respirophasic changes.     ASSESSMENT AND PLAN:  In summary, patient is a 51 year old male with above past medical history including NICM most likely related to ongoing metamphetamine use as well as history of medication non adherence who was referred to the HF clinc for further evaluation.  He tells me that he has not been using any drugs since 2021 and he has been very compliant with his medications.  He just tolerate Entresto yesterday half of the 24 mg tablets which he tolerated well so far.  He is also using the milrinone uninterrupted.  And taking care of his PICC site as needed.  He tells me that he would like to proceed with advanced therapy evaluation as he will stay off of any drugs in the future.     1. Chronic systolic heart failure. NICM. NYHA class III, stage D, inotrope dependent   at this time patient is inotrope dependent and requires the high dose milrinone use at 0.5 mcg/min.  This portends a poor prognosis potentially 6 months or less.  We will not change any of his medications today as he just tolerated take Entresto yesterday which is dosed at  12.5 mg twice daily which he takes.  In addition he is taking 40 mg of Lasix twice a day he also takes his digoxin as well as the apixaban without any complications.  Again given the recent change to Entresto we will not adjust medications.  We did have a long discussion with regards to advanced heart failure therapy eligibility.  We, at this time, can establish that he is not a heart transplant candidate given longstanding and heavy methamphetamine use.  He tells me that he did quit in December and he did indeed have a negative drug test in January 2022.  Given his very limited survival which I again estimate less than 6 months given the need for high dose milrinone use I agreed that we are going to have insurance clearance obtained and we will have our social work team and neuropsychology team talk to him to establish how long he needs to stay off every single drug in order to be considered at least for LVAD.  Please note that he does have a PICC line now for milrinone administration.  I did request insurance clearance I will get back to them in 2 to teams as soon as I have clearance.  They are willing to come to [place] at any time and complete an evaluation as needed.  They understand that they need to stay in [place] for at least a month should be approved for him for LVAD.  They understand that the chances are relatively low at this time.     2. Mild non-obstructive CAD. Treat medically.   3. Hypothyroidism. On levothyroxine. Check labs.  4. Elevated LFTs. Noted on first admission in 02/2021, likely secondary to hepatic congestion vs low flow. Improving on subsequent labs. Will follow.       I appreciate the opportunity to participate in the care of Nicolas Bustos . Please do not hesitate to contact me with any further questions.     Sincerely,   Renzo Sahni MD     [facility] Division of Cardiology

## 2022-03-11 ENCOUNTER — TELEPHONE (OUTPATIENT)
Dept: CARDIOLOGY | Facility: CLINIC | Age: 52
End: 2022-03-11

## 2022-03-18 ENCOUNTER — DOCUMENTATION ONLY (OUTPATIENT)
Dept: CARDIOLOGY | Facility: CLINIC | Age: 52
End: 2022-03-18
Payer: COMMERCIAL

## 2022-04-08 PROBLEM — R57.0 CARDIOGENIC SHOCK (H): Status: ACTIVE | Noted: 2022-02-04

## 2022-04-08 PROBLEM — I95.9 HYPOTENSION: Status: ACTIVE | Noted: 2022-01-19

## 2022-04-08 PROBLEM — K72.90 LIVER FAILURE (H): Status: ACTIVE | Noted: 2022-01-19

## 2022-04-08 PROBLEM — N19 RENAL FAILURE: Status: ACTIVE | Noted: 2022-01-19

## 2022-04-08 RX ORDER — FUROSEMIDE 80 MG
80 TABLET ORAL 2 TIMES DAILY
COMMUNITY
Start: 2022-04-01

## 2022-04-08 RX ORDER — POTASSIUM CHLORIDE 1500 MG/1
20 TABLET, EXTENDED RELEASE ORAL DAILY PRN
COMMUNITY
Start: 2022-03-11

## 2022-04-08 RX ORDER — METOLAZONE 2.5 MG/1
2.5 TABLET ORAL DAILY PRN
COMMUNITY
Start: 2022-03-11

## 2022-04-08 RX ORDER — MILRINONE LACTATE 0.2 MG/ML
49.2 INJECTION, SOLUTION INTRAVENOUS CONTINUOUS
COMMUNITY
Start: 2022-02-03 | End: 2023-02-03

## 2022-04-12 NOTE — PROGRESS NOTES
April 11, 2022     I had the pleasure of seeing Nicolas Bustos in clinic today. As you know he is a 51yr old male with of HFrEF, NICM, mild non-obstructive CAD and hypothyroidism. He was initially diagnosed with HF in 11/2019. EF at that time was 25-30% and angiogram showed minimal, non-obstructive CAD. EF remained low at 30%. ICD was apparently declined by patient at the time. Then he was admitted in 02/2021 with HF exacerbation secondary to non-compliance and being off medications for the previous 6 months. Echo showed LVEF of 30% and he was diuresed from 216 lbs to 206 lbs. Then he was readmitted 2/28-3/2/2021 with HF exacerbation. Was again diuresed from a weight of 215 lbs down to 203 lbs at the time of discharge. Repeat echo at he time showed further decrement in EF down to 10-15%. HF medications were restarted. Of note, UDS was positive for methamphetamines. He was seen in clinic recently and noted advanced Class III symptoms at the time in addition to bloating, fatigue and VEGAS to 1/2 block at the time. Weight was up to 214 lbs. He was scheduled to undergo ICD implantation on 4/13/21 yet this was postponed due to having been diagnosed with PNA. I saw him once back in 4/2021.   He did have several hospital admissions, most recently on 1/31/2021 for n/v and increased sob. Wt at home 202-203 lb. BNP 2763 on admission.  Where he was initially discharged on 0.125 of milrinone he had significant symptoms of the way home came back and milrinone was increased to 0.5 mcg/kg/min. 2/1/2022 echo showed decrement in LVEF to 10-15%, RV moderately dilated, mod MR, LVEDd 6 cm. Underwent a RHC 2/1/2022 showed Adolfo CI 1.7, PW 26. Complicated by atrial tachycardia during procedure with rates up to 130s thus started on digoxin load. Diuresed 5.4 L during admission with d/c wt 198 lb. Continued milrinone at 0.5 mcg/kg/min and lasix 40 mg twice daily at discharge.   Today he is here for follow-up.  Notes that his been doing  relatively well and denies any illicit drug use recently including methamphetamines.  He did have a negative test however there were some concerns about this was done.  He is a little bit concerned about the PICC line site however denies any pain bleeding or drainage from the area.  Overall doing about the same continues to use milrinone 0.5 mcg/kg/min without any issues.    Past Medical History:     CHF (congestive heart failure) ([facility]) 2019     Coronary artery disease     Hashimoto's disease     Hypothyroidism     Social History   Socioeconomic History     Marital status:    Spouse name: Quita Bustos     Number of children: Not on file     Years of education: Not on file     Highest education level: Not on file   Occupational History     Not on file   Social Needs     Financial resource strain: Not on file     Food insecurity   Worry: Not on file   Inability: Not on file     Transportation needs   Medical: Not on file   Non-medical: Not on file   Tobacco Use     Smoking status: Never Smoker     Smokeless tobacco: Current User   Types: Chew   Substance and Sexual Activity     Alcohol use: Yes   Frequency: Monthly or less   Drinks per session: 1 or 2   Binge frequency: Less than monthly     Drug use: Never     Sexual activity: Yes   Partners: Female   Lifestyle     Physical activity   Days per week: Not on file   Minutes per session: Not on file     Stress: Not on file   Relationships     Social connections   Talks on phone: Not on file   Gets together: Not on file   Attends Sabianist service: Not on file   Active member of club or organization: Not on file   Attends meetings of clubs or organizations: Not on file   Relationship status: Not on file     Intimate partner violence   Fear of current or ex partner: Not on file   Emotionally abused: Not on file   Physically abused: Not on file   Forced sexual activity: Not on file   Other Topics Concern     Not on file   Social History Narrative     Not on file      Family History   Problem Relation Age of Onset     No Known Problems Mother     No Known Problems Father     Heart Disease Brother     Heart Disease Maternal Grandfather      CURRENT MEDICATIONS:  Current Outpatient Medications   Medication     albuterol (PROAIR HFA/PROVENTIL HFA/VENTOLIN HFA) 108 (90 Base) MCG/ACT inhaler     apixaban ANTICOAGULANT (ELIQUIS) 5 MG tablet     aspirin (ASA) 81 MG EC tablet     digoxin (LANOXIN) 250 MCG tablet     furosemide (LASIX) 80 MG tablet     isosorbide mononitrate (IMDUR) 30 MG 24 hr tablet     levothyroxine (SYNTHROID/LEVOTHROID) 150 MCG tablet     magnesium oxide (MAG-OX) 400 (241.3 Mg) MG tablet     metolazone (ZAROXOLYN) 2.5 MG tablet     metoprolol succinate ER (TOPROL-XL) 50 MG 24 hr tablet     milrinone (PRIMACOR) infusion 200 mcg/mL PREMIX     omeprazole (PRILOSEC) 40 MG DR capsule     potassium chloride ER (KLOR-CON M) 20 MEQ CR tablet     sacubitril-valsartan (ENTRESTO) 49-51 MG per tablet     triamcinolone (NASACORT) 55 MCG/ACT nasal aerosol     No current facility-administered medications for this visit.     ROS:   Constitutional: No fever, chills, or sweats. Weight is 0 lbs 0 oz  ENT: No visual disturbance, ear ache, epistaxis, sore throat.   Allergies/Immunologic: Negative.   Respiratory: No cough, hemoptysis.   Cardiovascular: As per HPI.   GI: No nausea, vomiting, hematemesis, melena, or hematochezia.   : No urinary frequency, dysuria, or hematuria.   Integument: Negative.   Psychiatric: Pleasant, no major depression noted  Neuro: No focal neurological deficits noted  Endocrinology: Negative.   Musculoskeletal: As per HPI.      EXAM:  Blood pressure today is 112/72 mmHg heart rate is 86 bpm weight is 226  General: appears comfortable, alert and oriented  Head: normocephalic, atraumatic  Eyes: anicteric sclera, EOMI , PERRL  Neck: no adenopathy  Orophyarynx: moist mucosa, no lesions noted  Heart: regular, S1/S2, no murmurs, rubs or gallop. Estimated JVP at  5 cmH2O  Lungs: CTAB, No wheezing.   Abdomen: soft, non-tender, bowel sounds present, no hepatosplenomegaly  Extremities: No LE edema today  Skin: no open lesions noted  Neuro: grossly non-focal     Labs:  Reviewed     Echo 2019: LVEF 25-30%; LVED 5.4 cm; 2+ MR  Echo 2019: LVEF 30-35%; LVEDd 5.8 cm; 2+ MR  Echo 2021: LVEF 30%, moderate MR  Echo 3/1/2021: LVEF 10-15%; LVEDd 6.2 cm; moderate MR   Left Ventricle: Severely reduced left ventricular systolic function.  The EF is visually estimated to be 10-15%.     Left Ventricle: There is mild concentric hypertrophy.     Right Ventricle: The right ventricle is mildly dilated.     Mitral Valve: There is moderate regurgitation.     Tricuspid Valve: There is mild regurgitation. RVSP measurin.8      Coronary angiogram 2019:  Minimal non-obstructive coronary artery disease  Non-ischemic cardiomyopathy     TTE 22:     Left Ventricle: The left ventricle is mildly dilated. Wall thickness is   normal. Severely reduced left ventricular systolic function. The EF is   visually estimated to be 10-15%.      Right Ventricle: The right ventricle is moderately dilated. Systolic   function is moderate-to-severely reduced.      Mitral Valve: There is moderate regurgitation.      IVC/SVC: Dilated IVC with minimal respirophasic changes.      ASSESSMENT AND PLAN:  In summary, patient is a 51 year old male with above past medical history including NICM most likely related to ongoing metamphetamine use as well as history of medication non adherence who was referred to the HF clinc for further evaluation.  He tells me that he has not been using any drugs since 2021 and he has been very compliant with his medications.  His Entresto was recently uptitrated to 49 mg twice daily dosing with successfully and his blood pressure is acceptable.  He tolerated these changes well and his creatinine and potassium remain stable.  He continues to use the milrinone 0.5  mg/kg/min and looking at the dressing which is currently on the left side it does not look infected at this time.  However he needs a dressing changes we will do this today for him here.  We again discussed that his long-term survival is limited and we are working on insurance to get him for potential LVAD.  However we are having some issues with the [facility] not willing to approve the LVAD procedure itself.  We continue to discussions try to expedite it as much as possible.  We will repeat the drug test today in addition to routine blood work.  We will continue to monitor him closely and see him back 2 months here or sooner as needed in [place].  Potentially we will bring him up and proceed with the LVAD evaluation and implantation showed the insurance work out.     I appreciate the opportunity to participate in the care of Nicolas Bustos . Please do not hesitate to contact me with any further questions.     Sincerely,   Renzo Sahni MD     [facility] Division of Cardiology

## 2022-04-13 ENCOUNTER — OFFICE VISIT (OUTPATIENT)
Dept: CARDIOLOGY | Facility: OTHER | Age: 52
End: 2022-04-13
Payer: COMMERCIAL

## 2022-04-13 DIAGNOSIS — I10 BENIGN ESSENTIAL HYPERTENSION: Primary | ICD-10-CM

## 2022-04-13 DIAGNOSIS — I50.84 ACC/AHA STAGE D HEART FAILURE (H): ICD-10-CM

## 2022-04-13 DIAGNOSIS — I42.8 NONISCHEMIC CARDIOMYOPATHY (H): ICD-10-CM

## 2022-04-13 DIAGNOSIS — I50.20 HEART FAILURE WITH REDUCED EJECTION FRACTION, NYHA CLASS III (H): ICD-10-CM

## 2022-04-13 PROCEDURE — 99214 OFFICE O/P EST MOD 30 MIN: CPT | Performed by: INTERNAL MEDICINE

## 2022-04-13 RX ORDER — ACETAMINOPHEN 500 MG
500-1000 TABLET ORAL EVERY 6 HOURS PRN
COMMUNITY

## 2022-04-13 ASSESSMENT — PAIN SCALES - GENERAL: PAINLEVEL: NO PAIN (0)

## 2022-04-13 NOTE — LETTER
4/13/2022      RE: Nicolas Bustos  [address] [facility] [address]       Dear Colleague,    Thank you for the opportunity to participate in the care of your patient, Nicolas Bustos, at the [facility] HEART SERVICES [place] at [facility]. Please see a copy of my visit note below.    April 11, 2022     I had the pleasure of seeing Nicolas Bustos in clinic today. As you know he is a 51yr old male with of HFrEF, NICM, mild non-obstructive CAD and hypothyroidism. He was initially diagnosed with HF in 11/2019. EF at that time was 25-30% and angiogram showed minimal, non-obstructive CAD. EF remained low at 30%. ICD was apparently declined by patient at the time. Then he was admitted in 02/2021 with HF exacerbation secondary to non-compliance and being off medications for the previous 6 months. Echo showed LVEF of 30% and he was diuresed from 216 lbs to 206 lbs. Then he was readmitted 2/28-3/2/2021 with HF exacerbation. Was again diuresed from a weight of 215 lbs down to 203 lbs at the time of discharge. Repeat echo at he time showed further decrement in EF down to 10-15%. HF medications were restarted. Of note, UDS was positive for methamphetamines. He was seen in clinic recently and noted advanced Class III symptoms at the time in addition to bloating, fatigue and VEGAS to 1/2 block at the time. Weight was up to 214 lbs. He was scheduled to undergo ICD implantation on 4/13/21 yet this was postponed due to having been diagnosed with PNA. I saw him once back in 4/2021.   He did have several hospital admissions, most recently on 1/31/2021 for n/v and increased sob. Wt at home 202-203 lb. BNP 2763 on admission.  Where he was initially discharged on 0.125 of milrinone he had significant symptoms of the way home came back and milrinone was increased to 0.5 mcg/kg/min. 2/1/2022 echo showed decrement in LVEF to 10-15%, RV moderately dilated, mod MR, LVEDd 6 cm.  Underwent a RHC 2/1/2022 showed Adolfo CI 1.7, PW 26. Complicated by atrial tachycardia during procedure with rates up to 130s thus started on digoxin load. Diuresed 5.4 L during admission with d/c wt 198 lb. Continued milrinone at 0.5 mcg/kg/min and lasix 40 mg twice daily at discharge.   Today he is here for follow-up.  Notes that his been doing relatively well and denies any illicit drug use recently including methamphetamines.  He did have a negative test however there were some concerns about this was done.  He is a little bit concerned about the PICC line site however denies any pain bleeding or drainage from the area.  Overall doing about the same continues to use milrinone 0.5 mcg/kg/min without any issues.    Past Medical History:     CHF (congestive heart failure) ([facility]) 2019     Coronary artery disease     Hashimoto's disease     Hypothyroidism     Social History   Socioeconomic History     Marital status:    Spouse name: Quita Bustos     Number of children: Not on file     Years of education: Not on file     Highest education level: Not on file   Occupational History     Not on file   Social Needs     Financial resource strain: Not on file     Food insecurity   Worry: Not on file   Inability: Not on file     Transportation needs   Medical: Not on file   Non-medical: Not on file   Tobacco Use     Smoking status: Never Smoker     Smokeless tobacco: Current User   Types: Chew   Substance and Sexual Activity     Alcohol use: Yes   Frequency: Monthly or less   Drinks per session: 1 or 2   Binge frequency: Less than monthly     Drug use: Never     Sexual activity: Yes   Partners: Female   Lifestyle     Physical activity   Days per week: Not on file   Minutes per session: Not on file     Stress: Not on file   Relationships     Social connections   Talks on phone: Not on file   Gets together: Not on file   Attends Taoist service: Not on file   Active member of club or organization: Not on file    Attends meetings of clubs or organizations: Not on file   Relationship status: Not on file     Intimate partner violence   Fear of current or ex partner: Not on file   Emotionally abused: Not on file   Physically abused: Not on file   Forced sexual activity: Not on file   Other Topics Concern     Not on file   Social History Narrative     Not on file     Family History   Problem Relation Age of Onset     No Known Problems Mother     No Known Problems Father     Heart Disease Brother     Heart Disease Maternal Grandfather      CURRENT MEDICATIONS:  Current Outpatient Medications   Medication     albuterol (PROAIR HFA/PROVENTIL HFA/VENTOLIN HFA) 108 (90 Base) MCG/ACT inhaler     apixaban ANTICOAGULANT (ELIQUIS) 5 MG tablet     aspirin (ASA) 81 MG EC tablet     digoxin (LANOXIN) 250 MCG tablet     furosemide (LASIX) 80 MG tablet     isosorbide mononitrate (IMDUR) 30 MG 24 hr tablet     levothyroxine (SYNTHROID/LEVOTHROID) 150 MCG tablet     magnesium oxide (MAG-OX) 400 (241.3 Mg) MG tablet     metolazone (ZAROXOLYN) 2.5 MG tablet     metoprolol succinate ER (TOPROL-XL) 50 MG 24 hr tablet     milrinone (PRIMACOR) infusion 200 mcg/mL PREMIX     omeprazole (PRILOSEC) 40 MG DR capsule     potassium chloride ER (KLOR-CON M) 20 MEQ CR tablet     sacubitril-valsartan (ENTRESTO) 49-51 MG per tablet     triamcinolone (NASACORT) 55 MCG/ACT nasal aerosol     No current facility-administered medications for this visit.     ROS:   Constitutional: No fever, chills, or sweats. Weight is 0 lbs 0 oz  ENT: No visual disturbance, ear ache, epistaxis, sore throat.   Allergies/Immunologic: Negative.   Respiratory: No cough, hemoptysis.   Cardiovascular: As per HPI.   GI: No nausea, vomiting, hematemesis, melena, or hematochezia.   : No urinary frequency, dysuria, or hematuria.   Integument: Negative.   Psychiatric: Pleasant, no major depression noted  Neuro: No focal neurological deficits noted  Endocrinology: Negative.    Musculoskeletal: As per HPI.      EXAM:  Blood pressure today is 112/72 mmHg heart rate is 86 bpm weight is 226  General: appears comfortable, alert and oriented  Head: normocephalic, atraumatic  Eyes: anicteric sclera, EOMI , PERRL  Neck: no adenopathy  Orophyarynx: moist mucosa, no lesions noted  Heart: regular, S1/S2, no murmurs, rubs or gallop. Estimated JVP at 5 cmH2O  Lungs: CTAB, No wheezing.   Abdomen: soft, non-tender, bowel sounds present, no hepatosplenomegaly  Extremities: No LE edema today  Skin: no open lesions noted  Neuro: grossly non-focal     Labs:  Reviewed     Echo 2019: LVEF 25-30%; LVED 5.4 cm; 2+ MR  Echo 2019: LVEF 30-35%; LVEDd 5.8 cm; 2+ MR  Echo 2021: LVEF 30%, moderate MR  Echo 3/1/2021: LVEF 10-15%; LVEDd 6.2 cm; moderate MR   Left Ventricle: Severely reduced left ventricular systolic function.  The EF is visually estimated to be 10-15%.     Left Ventricle: There is mild concentric hypertrophy.     Right Ventricle: The right ventricle is mildly dilated.     Mitral Valve: There is moderate regurgitation.     Tricuspid Valve: There is mild regurgitation. RVSP measurin.8      Coronary angiogram 2019:  Minimal non-obstructive coronary artery disease  Non-ischemic cardiomyopathy     TTE 22:     Left Ventricle: The left ventricle is mildly dilated. Wall thickness is   normal. Severely reduced left ventricular systolic function. The EF is   visually estimated to be 10-15%.      Right Ventricle: The right ventricle is moderately dilated. Systolic   function is moderate-to-severely reduced.      Mitral Valve: There is moderate regurgitation.      IVC/SVC: Dilated IVC with minimal respirophasic changes.      ASSESSMENT AND PLAN:  In summary, patient is a 51 year old male with above past medical history including NICM most likely related to ongoing metamphetamine use as well as history of medication non adherence who was referred to the  clinc for further evaluation.   He tells me that he has not been using any drugs since December 2021 and he has been very compliant with his medications.  His Entresto was recently uptitrated to 49 mg twice daily dosing with successfully and his blood pressure is acceptable.  He tolerated these changes well and his creatinine and potassium remain stable.  He continues to use the milrinone 0.5 mg/kg/min and looking at the dressing which is currently on the left side it does not look infected at this time.  However he needs a dressing changes we will do this today for him here.  We again discussed that his long-term survival is limited and we are working on insurance to get him for potential LVAD.  However we are having some issues with the [facility] not willing to approve the LVAD procedure itself.  We continue to discussions try to expedite it as much as possible.  We will repeat the drug test today in addition to routine blood work.  We will continue to monitor him closely and see him back 2 months here or sooner as needed in [place].  Potentially we will bring him up and proceed with the LVAD evaluation and implantation showed the insurance work out.     I appreciate the opportunity to participate in the care of Nicolas Bustos . Please do not hesitate to contact me with any further questions.     Sincerely,   Renzo Sahni MD     [facility] Division of Cardiology

## 2022-04-13 NOTE — NURSING NOTE
Chief Complaint   Patient presents with     Follow Up     Return visit. Urgent referral     Vitals were taken and medications reconciled.     Bernard Gonsales CMA  10:21 AM

## 2022-04-18 ENCOUNTER — CARE COORDINATION (OUTPATIENT)
Dept: CARDIOLOGY | Facility: CLINIC | Age: 52
End: 2022-04-18
Payer: COMMERCIAL

## 2022-04-20 ENCOUNTER — CARE COORDINATION (OUTPATIENT)
Dept: CARDIOLOGY | Facility: CLINIC | Age: 52
End: 2022-04-20
Payer: COMMERCIAL

## 2022-04-20 DIAGNOSIS — I50.22 CHRONIC SYSTOLIC CONGESTIVE HEART FAILURE (H): ICD-10-CM

## 2022-04-20 DIAGNOSIS — R09.89 OTHER SPECIFIED SYMPTOMS AND SIGNS INVOLVING THE CIRCULATORY AND RESPIRATORY SYSTEMS: ICD-10-CM

## 2022-04-20 DIAGNOSIS — Z79.899 OTHER LONG TERM (CURRENT) DRUG THERAPY: ICD-10-CM

## 2022-04-20 DIAGNOSIS — Z12.5 SCREENING PSA (PROSTATE SPECIFIC ANTIGEN): ICD-10-CM

## 2022-04-20 NOTE — PROGRESS NOTES
VAD COORDINATION NOTE    Called and spoke with Nicolas's wife Quita to discuss VAD eval. She tells me that she and Nicolas have been researching VAD online and are eager to proceed as soon as possible.     Nicolas has not had a colonoscopy recently. Recommended PCP visit ASAP to arrange.   Nicolas also has not had recent dental work and Quita believes he will likely need work done. Advised her to call dentist to set up visit ASAP and that he will need clearance from dentist for cardiac surgery.     Quita states she will be Nicolas's caregiver and that neither of them have commitments that would keep them from the [place] area for 30+ days post-discharge.     Quita will plan to accompany Nicolas to the [facility] for an overnight stay for eval.     Items needing to be completed:   --Eval orders  --Connect Nicolas to ROSENDOANASTACIA Parra to discuss travel/hotel stay  --Connect Nicolas to VAD  Elaine to arrange appointments    FYI to Dr. Sahni.    Lena Muse, BSN, RN, PHN, HNB-BC   4/20/2022 at 10:11 AM    UPDATE: Attempted to call Nicolas today, no answer, VM box has not been set up yet. Called Quita, no answer, left message requesting that Nicolas set up VM and call me back to discuss VAD eval.      4/21/2022 at 10:56 AM

## 2022-04-28 NOTE — PROGRESS NOTES
Called and spoke with Nicolas today to discuss advanced HF eval and to follow-up on status of dental clearance and colonoscopy. 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 He tells me that Dr. Sahni told him about the treatment options for his HF (including VAD, transplant) and that he is on board with the plan to evaluate his options.     Nicolas tells me that he has an appointment with the dentist tomorrow. I reminded him that we need a signed letter from his dentist indicating that he is cleared for cardiac surgery. Nicolas did not have a pen and paper with him to take down fax #, but Quita should have my direct line. Instructed Nicolas to have Quita call me after his visit and I will provide fax # at that time. He verbalized understanding and agreed.     Regarding colonoscopy, he tells me that he cannot get this done locally. I advised that we have a couple of options: 1) have colonoscopy done as locally as possible and connect with PCP for recommendations. 2) have colonoscopy done at [facility] when he comes for his other appointments. I did tell him that the latter option may mean staying in [place] another night.     Nicolas does not have lodging plans and he does have some financial concerns. Will route this message to STEVE Trevizo for review.     Nicolas confirmed that Quita would be his 30-day 24/7 caregiver, and that she will be present for the entirety of his evaluation. I confirmed that he and Quita understand that they need to be local to the [place] for the first 30 days post discharge. He asked me briefly about length of hospital stay for VAD implant. I told him that stay is 2-3 weeks at a minimum, longer pending course and whether he will need rehabilitating.     I asked Nicolas to check in with me early next week with an update on the colonoscopy situation and we'll go from there. I also encouraged him to call me with any questions or concerns  during his evaluation process. He verbalized understanding and agreed to plan.     LIZZETTE GreggN, RN, PHN, HNB-BC   4/28/2022 at 2:20 PM

## 2022-05-05 NOTE — PROGRESS NOTES
"Called Nicolas to check in re: colonoscopy/dental visit. Unable to connect with Nicolas (\"call cannot be completed at this time\").     Called Quita. She said that she has heard from other people that Nicolas's phone is having this issue and she will explore the situation when she gets home from work today. She tells me that Nicolas has yet to have an appointment with his PCP to discuss colonoscopy. She will remind him to do so. She tells me that Nicolas is seeing the dentist tomorrow -- she wrote down our fax # and instructions to ask dentist to fax dental clearance for cardiac surgery to us.     I asked Quita to have Nicolas call me once he sees PCP/has colonoscopy arranged, OR if he is having barriers to doing so. She agreed.     Lena Muse, LIZZETTEN, RN, PHN, HNB-BC   5/5/2022 at 2:16 PM      "

## 2022-05-17 ENCOUNTER — LAB (OUTPATIENT)
Dept: LAB | Facility: CLINIC | Age: 52
End: 2022-05-17
Payer: COMMERCIAL

## 2022-05-17 ENCOUNTER — ANCILLARY PROCEDURE (OUTPATIENT)
Dept: GENERAL RADIOLOGY | Facility: CLINIC | Age: 52
End: 2022-05-17
Attending: INTERNAL MEDICINE
Payer: COMMERCIAL

## 2022-05-17 ENCOUNTER — ANCILLARY PROCEDURE (OUTPATIENT)
Dept: CT IMAGING | Facility: CLINIC | Age: 52
End: 2022-05-17
Attending: INTERNAL MEDICINE
Payer: COMMERCIAL

## 2022-05-17 ENCOUNTER — ANCILLARY PROCEDURE (OUTPATIENT)
Dept: BONE DENSITY | Facility: CLINIC | Age: 52
End: 2022-05-17
Attending: INTERNAL MEDICINE
Payer: COMMERCIAL

## 2022-05-17 ENCOUNTER — ALLIED HEALTH/NURSE VISIT (OUTPATIENT)
Dept: CARDIOLOGY | Facility: CLINIC | Age: 52
End: 2022-05-17
Attending: INTERNAL MEDICINE
Payer: COMMERCIAL

## 2022-05-17 ENCOUNTER — OFFICE VISIT (OUTPATIENT)
Dept: NEUROPSYCHOLOGY | Facility: CLINIC | Age: 52
End: 2022-05-17
Attending: INTERNAL MEDICINE
Payer: COMMERCIAL

## 2022-05-17 DIAGNOSIS — I50.22 CHRONIC SYSTOLIC CONGESTIVE HEART FAILURE (H): ICD-10-CM

## 2022-05-17 DIAGNOSIS — Z12.5 SCREENING PSA (PROSTATE SPECIFIC ANTIGEN): ICD-10-CM

## 2022-05-17 DIAGNOSIS — F15.10 METHAMPHETAMINE ABUSE (H): Primary | ICD-10-CM

## 2022-05-17 DIAGNOSIS — F54 PSYCHOLOGICAL AND BEHAVIORAL FACTORS ASSOCIATED WITH DISORDERS OR DISEASES CLASSIFIED ELSEWHERE: ICD-10-CM

## 2022-05-17 DIAGNOSIS — Z01.818 PREOP EXAMINATION: ICD-10-CM

## 2022-05-17 DIAGNOSIS — Z79.899 OTHER LONG TERM (CURRENT) DRUG THERAPY: ICD-10-CM

## 2022-05-17 DIAGNOSIS — I50.22 CHRONIC SYSTOLIC CONGESTIVE HEART FAILURE (H): Primary | ICD-10-CM

## 2022-05-17 LAB
ABO/RH(D): NORMAL
ALBUMIN SERPL-MCNC: 3.7 G/DL (ref 3.4–5)
ALBUMIN UR-MCNC: NEGATIVE MG/DL
ALP SERPL-CCNC: 81 U/L (ref 40–150)
ALT SERPL W P-5'-P-CCNC: 24 U/L (ref 0–70)
AMPHETAMINES UR QL SCN: ABNORMAL
ANION GAP SERPL CALCULATED.3IONS-SCNC: 7 MMOL/L (ref 3–14)
ANTIBODY SCREEN: NEGATIVE
APPEARANCE UR: CLEAR
APTT PPP: 29 SECONDS (ref 22–38)
AST SERPL W P-5'-P-CCNC: 24 U/L (ref 0–45)
ATRIAL RATE - MUSE: 64 BPM
BARBITURATES UR QL: ABNORMAL
BASOPHILS # BLD AUTO: 0.1 10E3/UL (ref 0–0.2)
BASOPHILS NFR BLD AUTO: 1 %
BENZODIAZ UR QL: ABNORMAL
BILIRUB SERPL-MCNC: 0.3 MG/DL (ref 0.2–1.3)
BILIRUB UR QL STRIP: NEGATIVE
BUN SERPL-MCNC: 13 MG/DL (ref 7–30)
CALCIUM SERPL-MCNC: 8.7 MG/DL (ref 8.5–10.1)
CANNABINOIDS UR QL SCN: ABNORMAL
CHLORIDE BLD-SCNC: 109 MMOL/L (ref 94–109)
CHOLEST SERPL-MCNC: 172 MG/DL
CMV IGG SERPL IA-ACNC: >10 U/ML
CMV IGG SERPL IA-ACNC: ABNORMAL
CO2 SERPL-SCNC: 26 MMOL/L (ref 20–32)
COCAINE UR QL: ABNORMAL
COLOR UR AUTO: YELLOW
CREAT SERPL-MCNC: 0.96 MG/DL (ref 0.66–1.25)
DIASTOLIC BLOOD PRESSURE - MUSE: NORMAL MMHG
DRVVT SCREEN RATIO: 0.87
EOSINOPHIL # BLD AUTO: 0.2 10E3/UL (ref 0–0.7)
EOSINOPHIL NFR BLD AUTO: 5 %
ERYTHROCYTE [DISTWIDTH] IN BLOOD BY AUTOMATED COUNT: 14.6 % (ref 10–15)
ETHANOL UR QL SCN: ABNORMAL
FASTING STATUS PATIENT QL REPORTED: ABNORMAL
FERRITIN SERPL-MCNC: 59 NG/ML (ref 26–388)
GFR SERPL CREATININE-BSD FRML MDRD: >90 ML/MIN/1.73M2
GLUCOSE BLD-MCNC: 98 MG/DL (ref 70–99)
GLUCOSE UR STRIP-MCNC: NEGATIVE MG/DL
HBV CORE AB SERPL QL IA: NONREACTIVE
HBV SURFACE AB SERPL IA-ACNC: 0.22 M[IU]/ML
HBV SURFACE AG SERPL QL IA: NONREACTIVE
HCT VFR BLD AUTO: 45.1 % (ref 40–53)
HCV AB SERPL QL IA: NONREACTIVE
HDLC SERPL-MCNC: 50 MG/DL
HGB BLD-MCNC: 14.5 G/DL (ref 13.3–17.7)
HGB UR QL STRIP: NEGATIVE
HIV 1+2 AB+HIV1 P24 AG SERPL QL IA: NONREACTIVE
IMM GRANULOCYTES # BLD: 0 10E3/UL
IMM GRANULOCYTES NFR BLD: 0 %
INR PPP: 1.06 (ref 0.85–1.15)
INTERPRETATION ECG - MUSE: NORMAL
IRON SATN MFR SERPL: 23 % (ref 15–46)
IRON SERPL-MCNC: 66 UG/DL (ref 35–180)
KETONES UR STRIP-MCNC: NEGATIVE MG/DL
LA PPP-IMP: NEGATIVE
LDLC SERPL CALC-MCNC: 104 MG/DL
LEUKOCYTE ESTERASE UR QL STRIP: NEGATIVE
LUPUS INTERPRETATION: NORMAL
LYMPHOCYTES # BLD AUTO: 1.8 10E3/UL (ref 0.8–5.3)
LYMPHOCYTES NFR BLD AUTO: 38 %
MCH RBC QN AUTO: 29.2 PG (ref 26.5–33)
MCHC RBC AUTO-ENTMCNC: 32.2 G/DL (ref 31.5–36.5)
MCV RBC AUTO: 91 FL (ref 78–100)
MONOCYTES # BLD AUTO: 0.4 10E3/UL (ref 0–1.3)
MONOCYTES NFR BLD AUTO: 8 %
MUCOUS THREADS #/AREA URNS LPF: PRESENT /LPF
NEUTROPHILS # BLD AUTO: 2.3 10E3/UL (ref 1.6–8.3)
NEUTROPHILS NFR BLD AUTO: 48 %
NITRATE UR QL: NEGATIVE
NONHDLC SERPL-MCNC: 122 MG/DL
NRBC # BLD AUTO: 0 10E3/UL
NRBC BLD AUTO-RTO: 0 /100
NT-PROBNP SERPL-MCNC: 847 PG/ML (ref 0–900)
OPIATES UR QL SCN: ABNORMAL
P AXIS - MUSE: 44 DEGREES
PCP UR QL SCN: ABNORMAL
PH UR STRIP: 5 [PH] (ref 5–7)
PHOSPHATE SERPL-MCNC: 3.1 MG/DL (ref 2.5–4.5)
PLATELET # BLD AUTO: 249 10E3/UL (ref 150–450)
POTASSIUM BLD-SCNC: 3.6 MMOL/L (ref 3.4–5.3)
PR INTERVAL - MUSE: 180 MS
PREALB SERPL IA-MCNC: 23 MG/DL (ref 15–45)
PROT SERPL-MCNC: 7 G/DL (ref 6.8–8.8)
PSA SERPL-MCNC: 1.26 UG/L (ref 0–4)
PTT RATIO: 1.16
QRS DURATION - MUSE: 106 MS
QT - MUSE: 434 MS
QTC - MUSE: 447 MS
R AXIS - MUSE: -59 DEGREES
RBC # BLD AUTO: 4.96 10E6/UL (ref 4.4–5.9)
RBC URINE: 2 /HPF
SODIUM SERPL-SCNC: 142 MMOL/L (ref 133–144)
SP GR UR STRIP: 1.03 (ref 1–1.03)
SPECIMEN EXPIRATION DATE: NORMAL
SPERM #/AREA URNS HPF: PRESENT /HPF
SYSTOLIC BLOOD PRESSURE - MUSE: NORMAL MMHG
T AXIS - MUSE: 52 DEGREES
THROMBIN TIME: 15.2 SECONDS (ref 13–19)
TIBC SERPL-MCNC: 281 UG/DL (ref 240–430)
TRANSFERRIN SERPL-MCNC: 221 MG/DL (ref 210–360)
TRIGL SERPL-MCNC: 90 MG/DL
TSH SERPL DL<=0.005 MIU/L-ACNC: 0.46 MU/L (ref 0.4–4)
UROBILINOGEN UR STRIP-MCNC: NORMAL MG/DL
VENTRICULAR RATE- MUSE: 64 BPM
VZV IGG SER QL IA: 918.9 INDEX
VZV IGG SER QL IA: POSITIVE
WBC # BLD AUTO: 4.7 10E3/UL (ref 4–11)
WBC URINE: 1 /HPF

## 2022-05-17 PROCEDURE — 84100 ASSAY OF PHOSPHORUS: CPT | Performed by: PATHOLOGY

## 2022-05-17 PROCEDURE — 71046 X-RAY EXAM CHEST 2 VIEWS: CPT | Mod: GC | Performed by: RADIOLOGY

## 2022-05-17 PROCEDURE — 86803 HEPATITIS C AB TEST: CPT | Mod: 90 | Performed by: PATHOLOGY

## 2022-05-17 PROCEDURE — 99000 SPECIMEN HANDLING OFFICE-LAB: CPT | Performed by: PATHOLOGY

## 2022-05-17 PROCEDURE — 86644 CMV ANTIBODY: CPT | Mod: 90 | Performed by: PATHOLOGY

## 2022-05-17 PROCEDURE — G0103 PSA SCREENING: HCPCS | Performed by: PATHOLOGY

## 2022-05-17 PROCEDURE — 81382 HLA II TYPING 1 LOC HR: CPT | Performed by: PATHOLOGY

## 2022-05-17 PROCEDURE — 80061 LIPID PANEL: CPT | Performed by: PATHOLOGY

## 2022-05-17 PROCEDURE — 86832 HLA CLASS I HIGH DEFIN QUAL: CPT | Performed by: PATHOLOGY

## 2022-05-17 PROCEDURE — 82610 CYSTATIN C: CPT | Mod: 90 | Performed by: PATHOLOGY

## 2022-05-17 PROCEDURE — 86901 BLOOD TYPING SEROLOGIC RH(D): CPT | Mod: 90 | Performed by: PATHOLOGY

## 2022-05-17 PROCEDURE — 96133 NRPSYC TST EVAL PHYS/QHP EA: CPT | Performed by: CLINICAL NEUROPSYCHOLOGIST

## 2022-05-17 PROCEDURE — 70450 CT HEAD/BRAIN W/O DYE: CPT | Performed by: RADIOLOGY

## 2022-05-17 PROCEDURE — 90791 PSYCH DIAGNOSTIC EVALUATION: CPT | Performed by: CLINICAL NEUROPSYCHOLOGIST

## 2022-05-17 PROCEDURE — 86704 HEP B CORE ANTIBODY TOTAL: CPT | Mod: 90 | Performed by: PATHOLOGY

## 2022-05-17 PROCEDURE — 85613 RUSSELL VIPER VENOM DILUTED: CPT | Mod: 90 | Performed by: PATHOLOGY

## 2022-05-17 PROCEDURE — 82728 ASSAY OF FERRITIN: CPT | Performed by: PATHOLOGY

## 2022-05-17 PROCEDURE — 96139 PSYCL/NRPSYC TST TECH EA: CPT | Performed by: CLINICAL NEUROPSYCHOLOGIST

## 2022-05-17 PROCEDURE — 84134 ASSAY OF PREALBUMIN: CPT | Performed by: PATHOLOGY

## 2022-05-17 PROCEDURE — 77080 DXA BONE DENSITY AXIAL: CPT | Performed by: INTERNAL MEDICINE

## 2022-05-17 PROCEDURE — 74150 CT ABDOMEN W/O CONTRAST: CPT | Performed by: RADIOLOGY

## 2022-05-17 PROCEDURE — 80050 GENERAL HEALTH PANEL: CPT | Performed by: PATHOLOGY

## 2022-05-17 PROCEDURE — 87389 HIV-1 AG W/HIV-1&-2 AB AG IA: CPT | Mod: 90 | Performed by: PATHOLOGY

## 2022-05-17 PROCEDURE — 86481 TB AG RESPONSE T-CELL SUSP: CPT | Mod: 90 | Performed by: PATHOLOGY

## 2022-05-17 PROCEDURE — 84466 ASSAY OF TRANSFERRIN: CPT | Mod: 90 | Performed by: PATHOLOGY

## 2022-05-17 PROCEDURE — 96132 NRPSYC TST EVAL PHYS/QHP 1ST: CPT | Performed by: CLINICAL NEUROPSYCHOLOGIST

## 2022-05-17 PROCEDURE — 86833 HLA CLASS II HIGH DEFIN QUAL: CPT | Performed by: PATHOLOGY

## 2022-05-17 PROCEDURE — 81378 HLA I & II TYPING HR: CPT | Performed by: PATHOLOGY

## 2022-05-17 PROCEDURE — 71250 CT THORAX DX C-: CPT | Performed by: RADIOLOGY

## 2022-05-17 PROCEDURE — 85730 THROMBOPLASTIN TIME PARTIAL: CPT | Performed by: PATHOLOGY

## 2022-05-17 PROCEDURE — 86900 BLOOD TYPING SEROLOGIC ABO: CPT | Mod: 90 | Performed by: PATHOLOGY

## 2022-05-17 PROCEDURE — 85390 FIBRINOLYSINS SCREEN I&R: CPT | Performed by: PATHOLOGY

## 2022-05-17 PROCEDURE — 96138 PSYCL/NRPSYC TECH 1ST: CPT | Performed by: CLINICAL NEUROPSYCHOLOGIST

## 2022-05-17 PROCEDURE — 86706 HEP B SURFACE ANTIBODY: CPT | Mod: 90 | Performed by: PATHOLOGY

## 2022-05-17 PROCEDURE — 80321 ALCOHOLS BIOMARKERS 1OR 2: CPT | Mod: 90 | Performed by: PATHOLOGY

## 2022-05-17 PROCEDURE — 80307 DRUG TEST PRSMV CHEM ANLYZR: CPT | Mod: 90 | Performed by: PATHOLOGY

## 2022-05-17 PROCEDURE — 86663 EPSTEIN-BARR ANTIBODY: CPT | Mod: 90 | Performed by: PATHOLOGY

## 2022-05-17 PROCEDURE — 86787 VARICELLA-ZOSTER ANTIBODY: CPT | Mod: 90 | Performed by: PATHOLOGY

## 2022-05-17 PROCEDURE — 87340 HEPATITIS B SURFACE AG IA: CPT | Mod: 90 | Performed by: PATHOLOGY

## 2022-05-17 PROCEDURE — 83880 ASSAY OF NATRIURETIC PEPTIDE: CPT | Performed by: PATHOLOGY

## 2022-05-17 PROCEDURE — 36415 COLL VENOUS BLD VENIPUNCTURE: CPT | Performed by: PATHOLOGY

## 2022-05-17 PROCEDURE — 86850 RBC ANTIBODY SCREEN: CPT | Mod: 90 | Performed by: PATHOLOGY

## 2022-05-17 PROCEDURE — 85610 PROTHROMBIN TIME: CPT | Performed by: PATHOLOGY

## 2022-05-17 NOTE — PROGRESS NOTES
Patient of Dr. Sahni seen in clinic for psychosocial assessment.   60 minutes spent with patient and wife, Quita. 100% of visit consisted of counseling related to issues surrounding LVAD.    Psychosocial Assessment   Name: Nicolas Bustos     MRN:  [id number]        Patient Name / Age / Race: Nicolas Bustos 51 year old    Source of Information: Patient and wife   : Yovana Parra [facility]   Interview Date: May 17, 2022   Reason for Referral: Mechanical Circulatory Support     Current Living Situation   Location (City/State): [facility] [address]   With Whom: Living arrangements - the patient Lives with his spouse, 26yo dtr, Julia and her dtr.   Type of Home: single family: 1 level home with one step to get into the home   Working Phone? Yes    Three Pronged Outlet? Yes    Distance from Hospital: 7hrs from [facility] and 3 1/2 hrs West of [place], [place].    Need to Relocate Post Surgery? Yes    Discussed? Yes : Reviewed local hotel options and the [address]     Vocational/Employment/Financial   Employment: Full time   Occupation: Rancher   Education: 12 yrs    ? Yes : 3 yrs in the [employer]   Income: salary/wages and spouse's income   Insurance: [facility]   Name of Private Insurance: N/A    Medication Coverage: Yes     In current situation, pt. can afford medication and supply costs:  Yes    Other Financial Concerns/Issues: Pt and wife expressed financial stress related to temporary relocation. Instructed to look at financial assistance available through their [place]. We discussed general information on financial grants available. Short-term and long-term financial impact discussed.      Family/Social Support   Marital Status:    Partner Name: Quita   Length of Time : 2 1/2 yrs    Partner s Employment: Part-time in a gift shop   Relationship: stable/supportive   Children: 4 adult children- 2 live close to couple    Parents: Mother is  "alive  Father     Siblings: 2 full siblings 3- 1/2 siblings    Other Family or Friends Close by: Extended family and friends    Support System: available, helpful   Primary Support Person: Pt's wife will be primary support person. Discussed need to for her to attend LVAD education and to relocate with pt for the 30 days to provide 24hr support.    Issues: Pt's wife expressing understanding of caregiver support role and willingness to provide the support.      Activities/Functional Ability   Current Level: ambulatory and independent with ADL's   Daily Activities: Despite being on milrinone, pt continues to be able to perform the heavy labor of ranch work full-time    Transportation: self      Medical Status   Patient s understanding of need for surgical intervention: Good understanding    Advanced Directive? No    Details: Will think about completing    Adherence History: Concerns about adherence as pt is testing positive for amphetamines despite expressing knowledge that his heart failure is very serious and he is on milrinone.   Ability to Adhere to Complex Medical Regime: Would be able to manage the LVAD, but concern he will be able to abstain from drugs.     Behavioral   Chemical Dependency Issues: Yes : Hx of methamphetamine--tested positive 2021. Pt reports he went CD treatment in  and had a period of sobriety until . From  to currently, reports that at his highest use he was using 1/2gram of meth daily. More precise timeline of drug use is difficult to obtain. Pt reports he last used meth in December (). Pt denies hx of other drug use and reports he \"hardly\" drinks. Pt reports last drink was at [place] ().     Pt reports no legal consequences due to drug or alcohol use.    Writer asked if he would be amenable to CD evaluation if this was recommended. Pt expressed willingness to complete CD evaluation if recommended.     Noted that tox screen came back (+) for " amphetamines after writer had met w/ pt.     Smoker? No: denies any hx of smoking    Psychiatric impairment: No: Pt denies any history of mental health concerns, psychiatric hospitalizations or suicide attempts.    Coping Style/Strategies: Enjoys the duties that come ranching, being with family   Recent Legal History: No   Teaching Completed During Assessment Related To Mechanical Circulatory Support: 1. Housing and relocation needs post surgery.  2. Caregiver needs post surgery.  3. Financial issues related to surgery.  4. Risks of alcohol use post surgery.  5. Common psychosocial stressors pre/post surgery.  6. Support group availability.   Psychosocial Risks Reviewed Related To Mechanical Circulatory Support: 1. Increased stress related to your emotional, family, social, employment, or financial situation.  2. Affect on work and/or disability benefits.  3. Affect on future health and life insurance.  4. Outcome expectations may not be met.  5. Mental Health risks: anxiety, depression, PTSD, guilt, grief and chronic fatigue.     Observed Behavior   Well informed? Yes  Angry? No   Process info well? Yes  Hostile? No   Evasive? No Oriented X3? Yes    Cautious/Suspicious? No Motivated? Yes    Appropriate Behavior? Yes  Depressed? No   Appropriate Affect? Yes  Anxious? Yes    Interview Observations: Pt and wife where appropriately anxious, but engaged in assessment.      Selection Criteria Met   Plan for Support Yes    Chemical Dependence No   Smoking Yes    Mental Health Yes    Adequate Finances Yes      Risk Issues:  History of methamphetamine use--UA was positive 12/2021. Monthly UAs have been negative since 1/2022 until UA collected today that is (+) for amphetamines.     Final Evaluation/Assessment:     Social Work consult as part of LVAD evaluation. Met w/ pt and his wife, Quita. They had just completed LVAD education and were somewhat overwhelmed with the information they had just received. Pt lives about 7  1/2 hrs from [facility] with his wife, dtr and granddaughter. Despite pt being on Milrinone, he has been able to continue to work full-time as a rancher.     We discussed caregiver requirements during temporary 30 day relocation and pt identified his wife as the likely caregiver. Discussed caregiver role and need to attend LVAD education prior to pt's discharge. Pt's wife reports that she will likely ask her mother or sister to accompany her during the temporary relocation period and will also attend LVAD education.     Writer has concerns in area of chemical dependency. Pt with hx of Meth use but had been abstinent since he first tested positive in December (2021). Unfortunately, pt's drug screen today is positive for amphetamines. This did not come back until writer had met w/ pt. During assessment pt had reported he has been abstinent from all drugs. Pt had expressed willingness to complete CD evaluation if this was recommended by the team due to his history. It looks like he will need to get a CD evaluation with now positive drug screen.     Writer has no concerns in areas of smoking, mental health, insurance or finances.     From a psychosocial perspective, pt is a conditional candidate for LVAD due to current chemical dependency concerns. Pt likely requires CD evaluation. Will wait to see what substance abuse category pt is placed in once he undergoes neuropsych evaluation.      Patient understands risks and benefits of Mechanical Circulatory Support: Yes     Recommendation:    Conditional due CD concerns     Signature: Yovana Parra [facility]     Title: Licensed Independent Clinical                Interview Date: May 17, 2022

## 2022-05-17 NOTE — NURSING NOTE
"Met with patient and wifeto present the HM3 as a possible treatment option.     Discussed patient and caregiver responsibilities before and after VAD implant. Clarified that, r/t COVID-19 visitor restrictions, only 2 caregivers may be trained. Clarified the need for a caregiver to be present for education and to assist patient for at least first 30 days after a patient returns home. Patient's designated caregiver is wife Quita.     Discussed basic overview of VAD equipment, on going management, need for anticoagulation, regular dressing change, grounded three-pronged outlet and functioning telephone. Also discussed frequency of follow-up clinic appointments and the need to stay locally for at least 2-4 weeks.  Reviewed restrictions of having a VAD such as no swimming, bathing, boats, MRI's, or arc welding.     Provided and discussed the VAD educational brochure, information regarding the VAD and transplant support groups, and \"VAD What You Should Know\" with additional details. Patient and wife signed \"VAD What You Should Know\" document. VAD coordinator contact information provided.  Encouraged patient and wife to call with questions. Learners verbalized understanding of the above information.      "

## 2022-05-17 NOTE — LETTER
2022       RE: Nicolas Bustos  [address] [facility] [address]     Dear Colleague,    Thank you for referring your patient, Nicolas Bustos, to the [facility] [place] at [facility]. Please see a copy of my visit note below.    NAME: Nicolas Bustos  MRN: [id number]  : 1970  VEGAS: 2022  [facility] [place] [address]      NEUROPSYCHOLOGICAL EVALUATION    RELEVANT HISTORY AND REASON FOR REFERRAL    This is a report of neuropsychological consultation regarding Nicolas Bustos, a 51-year-old, right-handed man with 12 years of formal education. His medical history includes heart failure with reduced ejection fraction, nonischemic cardiomyopathy, nonobstructive coronary artery disease, hypothyroidism with Hashimoto s thyroiditis, recently remitted tobacco dependence, and recently remitted methamphetamine abuse. He is being considered for advanced therapies for heart failure, such as LVAD and transplant. Dr. Renzo Sahni ordered this neuropsychological evaluation of brain functioning as part of the standard pre-procedure protocol, to better understand cognitive and psychosocial factors that affect LVAD/transplant candidacy.    Mr. Bustos was diagnosed with heart failure in . He was hospitalized in 2021 with worsening symptoms secondary to noncompliance. He had not been taking his medications for about 6 months. Drug screens were positive for methamphetamines and benzodiazepines. He continued using methamphetamines through , and his last reported use was in 2021. In records from the [place] system, toxicology screens have been negative in January, February, March, and 2022. Unfortunately, there was a positive amphetamine screening from labs obtained earlier today.    He tells me that his meth use began in , in the context of participating in  y[employer]. He then used it off and on until around 2006. 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 He quit completely in 2006 for about 11 to 12 years--notably, this was in the context of incarceration for 10 of those years--and then he restarted again around 2018. In 2021, he started using it more frequently to try and overcome the fatigue brought on by his heart failure. He reports using a couple of times per week throughout last year. He reports no use since December 2021. He says there were no withdrawal symptoms, and he is  not really  having any cravings for it. He denies any failure to meet responsibilities because he was using instead. Rather, he describes meth use as a way to gain energy so that he could meet his work responsibilities. In the last year, there has been extra stress in his marriage and interpersonal issues because of his meth use. He denies any recent legal consequences. He went through a court-ordered chemical dependency treatment program in 2006. He also served 10 years in longterm starting in 2006, on conspiracy charges related to methamphetamine possession. He says there are no outstanding legal issues at this time. He has been connected to [employer] groups, but his attendance is sporadic because the nearest meeting is 37 miles from his home.     He reports no history of alcohol problems. He says his last use of alcohol was around Christmas of 2021. He describes a typical habit of only drinking a few times per year, and only 1-2 drinks on occasion (AUDIT-C = 1, lower risk). PEth labs were collected today and are in process.     He used chewing tobacco beginning when he was 10 or 11 years old. He says that he quit either in December 2021 or January 2022. He does not use any smoking tobacco or vaping products. I do not see any cotinine labs in our records.     He reports no history of problematic gambling behaviors or any other  addictive/compulsive style behaviors.    Mr. Bustos denies any concerns about mental health, currently or historically. He denies any issues with depression, anxiety, manic/hypomanic periods, obsessive-compulsive disorder, disordered eating behaviors, trauma/abuse, hallucinatory experiences, or suicidal ideation. He says he has never taken psychiatric medications, participated in psychotherapy, or had a psychiatric hospitalization.    He demonstrates an appropriate understanding of his cardiac condition and reasons for considering advanced therapies. He says he has been told that if he does not go through with advanced therapies like LVAD or transplant, then he is unlikely to live through 2022. He is currently treated with milrinone, and he is feeling really good on it. 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 His hope for LVAD is that it serves as a bridge to transplant. He is aware that he faces risks including strokes, clotting, and trouble getting off of the respirator after the surgery.    He lives at home with his wife, Quita, [place] [facility]. They have been together for 20 years and  for the last 3 years. He had another marriage that ended in divorce about 25-30 years ago. He has one son, and his wife brought three daughters to the marriage. A daughter and granddaughter are temporarily living with them. He is aware of the need to relocate to the [place] area for approximately 30 days after the surgery. He is aware of the 30-day caregiver requirement. He says that his wife will be able to do it, and he feels like they have a good plan in place.    He does not perceive any cognitive difficulties, but he notes that his wife says he is forgetful. They share financial management at home, and he denies any trouble with his portion of it. He says that he manages his medications independently and without any difficulty. He has not seen any need for  changes in his driving habits or other aspects of instrumental ADLs. He does not need any assistance with basic ADLs.    He has a high school education. He served in [employer] for 3-1/2 years after high school, in the role of . 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    Head CT obtained today was unremarkable. Reported neurologic history is fairly unremarkable. He reports a couple of instances of horses kicking him in the head or throwing him and briefly losing consciousness, but no events prompted him to seek out medical attention or had any noticeable functional sequelae. There is no history of seizure, stroke, balance/coordination troubles, unilateral weakness or numbness, tremor/parkinsonism, or migraine. He has not perceived any changes to his basic senses of smell, taste, hearing, or vision.    He reports no history of COVID-19 infection.    Reported family includes heart disease for a brother and grandfather. He is not aware of any history of substance use disorders or psychiatric disorders in the family.    His current medications include acetaminophen, albuterol, apixaban, 81 mg aspirin, furosemide, isosorbide mononitrate, levothyroxine, magnesium oxide, metolazone, metoprolol, milrinone, omeprazole, potassium chloride, sacubitril/valsartan, and triamcinolone.    BEHAVIORAL OBSERVATIONS    Mr. Bustos was polite and cooperative with the evaluation. He appeared to be open and candid in the interview, and he provided a history generally consistent with information in the medical records. There were no indications of aphasia in his speech production or language comprehension. Thought processes were linear and goal-directed. During the testing session, he was quite tired. He reported only sleeping 2 hours the night before. He started dozing off during some tasks. He  declined breaks earlier in the appointment but took one break later on. He was somewhat hesitant to provide guesses, but he appeared to put forth appropriate effort, and he persisted well with the requested procedures. There might be some effects from his acute sleepiness, but the test results are seen as reasonable estimations of his cognitive abilities.    MEASURES ADMINISTERED    The following measures were administered by a trained psychometrist, under my supervision:    Orientation: Time, Place, Basic Personal Information, Recent US Presidents; Wide Range Achievement Test 5: Word Reading; Wechsler Abbreviated Scales of Intelligence-2: Vocabulary, Matrix Reasoning; Wechsler Adult Intelligence Scale-IV: Coding; Controlled Oral Word Association Test; [place] Naming Test; Emilia Visual Acuity Screen; Finger Tapping; Trail Making Test; Test of Sustained Attention & Tracking; Jason-Osterrieth Complex Figure Test; Jason Auditory Verbal Learning Test; Wechsler Memory Scale-IV: Logical Memory, Visual Reproduction; Edwards Depression Inventory-II; Edwards Anxiety Inventory; [place] Multiphasic Personality Inventory-3.    RESULTS AND INTERPRETATION    Orientation: Orientation was normal for time, place, and basic personal information. He was able to name 4 of the 6 most recent [place] presidents.     Intellectual Abilities: Performance on a reading and pronunciation test that is validated for estimating premorbid intelligence was average. Demonstrating vocabulary knowledge was low average. Visual reasoning through pattern identification was upper average.     Language & Related Skills: As noted above, basic reading and pronunciation skills were average. Confrontation naming was average. Letter-based verbal fluency was mildly below average.    Visual Perceptual & Constructional Skills: Binocular, uncorrected, near-point visual acuity was 20/30 on Emilia screening. Copying a complex geometric figure was average.     Motor Skills:  Speeded finger tapping was average with the dominant right hand and low average with the nondominant left hand.     Mental Speed & Executive Functioning: As noted above, speeded verbal fluency performances were below average. Performances were average across a variety of brief verbal tasks requiring executive management of attention and concentration. Cognitive processing speed was below average on a timed transcription task. Visual scanning and graphomotor sequencing under simple conditions was below average for speed and had 3 errors. In surprising contrast, scanning and sequencing under greater executive demands to control divided attention was average for speed and had no errors.     Learning & Anterograde Memory: Immediate verbal memory for short stories was low average, and delayed story recall was low average. Delayed recognition of story details was average. Learning a word list over repeated readings was low average to average. Delayed free recall of the list was low average, and delayed recognition of the list was low average. Immediate memory for simple designs was below average. Delayed recall of the simple designs was below average, and recognition of them was low average.     Emotional Functioning: On brief self-report inventories, he endorsed minimal symptoms related to depression (BDI-II = 7) and anxiety (PHYLLIS = 2). His response set to a longer questionnaire for objective assessment of personality functioning and emotional coping patterns (MMPI-3) was technically valid and interpretable, with no abnormal findings among the response validity scales. There was a single elevation among the core clinical scales. He endorsed behaviors and experiences associated with hypomanic activation, such as excitability, impulsivity, and elevated mood. He is likely to be restless and easily bored. He may demonstrate poor impulse control, sensation-seeking, risk-taking, and other forms of under-controlled behavior.  Mood regulation issues may be present, with instability, aggression, euphoria, and excitability. Among the various supplemental scales, he reported below-average levels of stress, worry, and anxiety. He described himself as having strong opinions, standing up for himself, being assertive and direct, and being able to lead others. He may see himself as a strong leader, but others may see him as overly assertive and domineering. He reported engaging in instrumentally aggressive behavior. The profile raises concerns for bipolar mood disorders with manic/hypomanic episodes, as well as personality disorders involving antagonistic behaviors such as narcissistic and antisocial. The MMPI-3 does not independently render psychiatric diagnoses. It raises clinical hypotheses for further exploration in the context of mental health treatment.    IMPRESSIONS AND RECOMMENDATIONS    The cognitive test results are minimally abnormal. In the context of very little sleep the night before and falling asleep at one point during the testing, today s data only show some variability in attention, concentration, and mental speed. I am not seeing evidence of a cognitive disorder that would interfere with medical decision making or with learning and carrying out postsurgical care. His prior history of medical noncompliance is not explained by cognitive dysfunction; it is reasonably attributable to concurrent substance abuse and personality factors.     His MMPI-3 profile is consistent with the known history of substance abuse and legal troubles. With the history of amphetamine abuse, I think Mr. Bustos fits the LVAD/transplant committee s classification of category 2 substance abuse. He began abusing methamphetamines in 1999. There was a decade of incarceration for meth-related charges starting in 2006, and he went through a court-ordered CD program. He remained abstinent until about 8037-1613 then relapsed. He was diagnosed with heart  failure in 2019. His approach to heart failure s growing fatigue/stamina problems over 2021 was to increase his frequency of meth use. He reports no use since December 2021. Outside records showed monthly negative tox screens from January to April, but the screening obtained this morning came back positive for amphetamines. Unfortunately, if this is a confirmable finding, it would reset his clock for accumulating enough documented abstinence to be immediately eligible for LVAD. I recommend additional consultation with the Addiction Medicine team or referral for a formal CD evaluation. He is likely to require additional clinical support to maintain sobriety.     Kervin Estevez, PhD, LP, ABPP-CN  Board Certified in Clinical Neuropsychology  Licensed Psychologist [id number]      Time spent: One unit psychiatric evaluation including records review, interview, and clinical assessment licensed and board-certified neuropsychologist (CPT 89979). 102 minutes neuropsychological testing evaluation by licensed and board-certified neuropsychologist, including integration of patient data, interpretation of standardized test results and clinical data, clinical decision-making, treatment planning, report, and interactive feedback to the patient (CPT 70064, 41196). 220 minutes of psychological and neuropsychological test administration and scoring by technician (CPT 44864, 75303). Diagnoses: F15.10, I50.22, F54, Z01.818      Again, thank you for allowing me to participate in the care of your patient.      Sincerely,    Kervin Estevez, PhD

## 2022-05-17 NOTE — NURSING NOTE
Pt was seen for neuropsychological evaluation at the request of Renzo Sahni MD for the purposes of diagnostic clarification and treatment planning. 220 minutes of test administration and scoring were provided by this writer. Please see Dr. Kervin Estevez's report for a full interpretation of the findings.    Leslie Fall  Psychometrist

## 2022-05-18 ENCOUNTER — ANCILLARY PROCEDURE (OUTPATIENT)
Dept: ULTRASOUND IMAGING | Facility: CLINIC | Age: 52
End: 2022-05-18
Attending: INTERNAL MEDICINE
Payer: COMMERCIAL

## 2022-05-18 ENCOUNTER — OFFICE VISIT (OUTPATIENT)
Dept: PALLIATIVE CARE | Facility: CLINIC | Age: 52
End: 2022-05-18
Attending: INTERNAL MEDICINE
Payer: COMMERCIAL

## 2022-05-18 ENCOUNTER — ALLIED HEALTH/NURSE VISIT (OUTPATIENT)
Dept: TRANSPLANT | Facility: CLINIC | Age: 52
End: 2022-05-18
Attending: INTERNAL MEDICINE
Payer: COMMERCIAL

## 2022-05-18 VITALS
OXYGEN SATURATION: 97 % | SYSTOLIC BLOOD PRESSURE: 121 MMHG | DIASTOLIC BLOOD PRESSURE: 76 MMHG | RESPIRATION RATE: 18 BRPM | BODY MASS INDEX: 29.34 KG/M2 | HEART RATE: 75 BPM | WEIGHT: 216.6 LBS | TEMPERATURE: 97.7 F | HEIGHT: 72 IN

## 2022-05-18 DIAGNOSIS — R09.89 OTHER SPECIFIED SYMPTOMS AND SIGNS INVOLVING THE CIRCULATORY AND RESPIRATORY SYSTEMS: ICD-10-CM

## 2022-05-18 DIAGNOSIS — Z79.899 OTHER LONG TERM (CURRENT) DRUG THERAPY: ICD-10-CM

## 2022-05-18 DIAGNOSIS — I50.22 CHRONIC SYSTOLIC CONGESTIVE HEART FAILURE (H): ICD-10-CM

## 2022-05-18 DIAGNOSIS — I42.8 NICM (NONISCHEMIC CARDIOMYOPATHY) (H): Primary | ICD-10-CM

## 2022-05-18 DIAGNOSIS — Z71.89 ADVANCE CARE PLANNING: ICD-10-CM

## 2022-05-18 LAB
6 MIN WALK (FT): 1600 FT
6 MIN WALK (M): 488 M
EBV EA-D IGG SER-ACNC: <5 U/ML (ref 0–9)
EBV EA-D IGG SER-ACNC: NORMAL
QUANTIFERON MITOGEN: 10 IU/ML
QUANTIFERON NIL TUBE: 0.02 IU/ML
QUANTIFERON TB1 TUBE: 0.04 IU/ML
QUANTIFERON TB2 TUBE: 0.03

## 2022-05-18 PROCEDURE — G0463 HOSPITAL OUTPT CLINIC VISIT: HCPCS

## 2022-05-18 PROCEDURE — 93880 EXTRACRANIAL BILAT STUDY: CPT | Performed by: RADIOLOGY

## 2022-05-18 PROCEDURE — 94726 PLETHYSMOGRAPHY LUNG VOLUMES: CPT | Performed by: INTERNAL MEDICINE

## 2022-05-18 PROCEDURE — 94729 DIFFUSING CAPACITY: CPT | Performed by: INTERNAL MEDICINE

## 2022-05-18 PROCEDURE — 94618 PULMONARY STRESS TESTING: CPT | Performed by: INTERNAL MEDICINE

## 2022-05-18 PROCEDURE — 93922 UPR/L XTREMITY ART 2 LEVELS: CPT | Performed by: RADIOLOGY

## 2022-05-18 PROCEDURE — 93970 EXTREMITY STUDY: CPT | Performed by: RADIOLOGY

## 2022-05-18 PROCEDURE — 94375 RESPIRATORY FLOW VOLUME LOOP: CPT | Performed by: INTERNAL MEDICINE

## 2022-05-18 PROCEDURE — 76700 US EXAM ABDOM COMPLETE: CPT | Performed by: RADIOLOGY

## 2022-05-18 PROCEDURE — G0463 HOSPITAL OUTPT CLINIC VISIT: HCPCS | Mod: 27

## 2022-05-18 PROCEDURE — 93925 LOWER EXTREMITY STUDY: CPT | Performed by: RADIOLOGY

## 2022-05-18 PROCEDURE — 99204 OFFICE O/P NEW MOD 45 MIN: CPT | Mod: GC | Performed by: INTERNAL MEDICINE

## 2022-05-18 PROCEDURE — 99207 PR NO CHARGE LOS: CPT

## 2022-05-18 PROCEDURE — 93923 UPR/LXTR ART STDY 3+ LVLS: CPT | Performed by: RADIOLOGY

## 2022-05-18 PROCEDURE — 93930 UPPER EXTREMITY STUDY: CPT | Mod: GC | Performed by: RADIOLOGY

## 2022-05-18 ASSESSMENT — PAIN SCALES - GENERAL: PAINLEVEL: NO PAIN (0)

## 2022-05-18 NOTE — PATIENT INSTRUCTIONS
Thank you for seeing the Palliative Care Clinic today.     1) Please complete the advance directive and bring it with you to your next appointment with your cardiologist so that we can have it notarized and scanned into our system.     You can reach the Palliative Care Team during business hours at the following numbers:   -For the [facility], call [phone]  -To reach the palliative RN for questions or refills, call [phone]       To reach the Palliative Care Provider on-call After-hours or on holidays and weekends, call: [phone].  Please note that we are not able to provide pain medication refills on evenings or weekends.

## 2022-05-18 NOTE — NURSING NOTE
Oncology Rooming Note    May 18, 2022 12:43 PM   Nicolas Bustos is a 51 year old male who presents for:    Chief Complaint   Patient presents with     New Patient     Chronic systolic congestive heart failure (H)      Initial Vitals: /76   Pulse 75   Temp 97.7  F (36.5  C) (Tympanic)   Resp 18   Ht 1.829 m (6')   Wt 98.2 kg (216 lb 9.6 oz)   SpO2 97%   BMI 29.38 kg/m   Estimated body mass index is 29.38 kg/m  as calculated from the following:    Height as of this encounter: 1.829 m (6').    Weight as of this encounter: 98.2 kg (216 lb 9.6 oz). Body surface area is 2.23 meters squared.  No Pain (0) Comment: Data Unavailable   No LMP for male patient.  Allergies reviewed: Yes  Medications reviewed: Yes    Medications: Medication refills not needed today.  Pharmacy name entered into EPIC: [facility]    Clinical concerns: new patient      Noy Yeboah CMA

## 2022-05-18 NOTE — PROGRESS NOTES
[facility] Transplant  Outpatient MNT: Heart Transplant/VAD Evaluation    Current BMI: 29.5 (HT 72.5 in,  lbs/100 kg)  BMI is within recommendation of <35 for heart transplant     Time Spent: 15 minutes  Visit Type: Initial   Referring Physician: Bora   Pt accompanied by: his wife     History of previous txp: none     Nutrition Assessment  Pt reports not following any special diets.     - Appetite: good   - Food allergies/intolerances: gout sx with new diuretic; noticing that pork and seafood exacerbate sx   - Meal prep & grocery shopping: pt or wife   - Previous RD education: no   - Issues chewing or swallowing: no   - N/V/D/C: no   - Food access concerns: no     Vitamins, Supplements, Pertinent Meds: magnesium   Herbal Medicines/Supplements: none     Edema: none     Weight hx: stable     Diet Recall  Breakfast 2 eggs w/ hashbrowns    Lunch Hamburger or tuna s/w    Dinner Spaghetti; roast; some salad + usually a starch    Snacks None    Beverages 1 Ensure/d- started a few months ago; Coke (3-4/day), Eliseo Aid (1 gallon/day), sporadic Gatorade     Alcohol None    Dining out 1x/month     Physical Activity  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     Nutrition Diagnosis  No nutrition diagnosis identified at this time.    Nutrition Intervention  Nutrition education provided:  Discussed sodium intake (low sodium foods and drinks, seasoning food without salt and tips for low sodium diet). Reviewed some dietary recs if interested, such as including more veggies, reducing sugary beverage intake, although not required for VAD/txp.     Reviewed post txp diet guidelines in brief (will review in further detail post txp):  (1) Review of proper food safety measures d/t immunosuppressant therapy post-op and increased risk for food-borne illness    (2)  Avoid the following post txp d/t risk for rejection, unknown effects on the organs, and/or potential interactions with immunosuppressants:  - Herbal, Chinese, holistic, chiropractic, natural, alternative medicines and supplements  - Detoxes and cleanses  - Weight loss pills  - Protein powders or other products with extracts or herbs (ie green tea extract)    (3) Med regimen and possible side effects    Reviewed post VAD nutrition:  High likelihood of early satiety postop with placement of VAD, requiring small/frequent meals. Discussed need for high protein intake for healing and how adequate protein intake may be impacted by early satiety.     Patient Understanding: Pt verbalized understanding of education provided.  Expected Engagement: Good  Follow-Up Plans: PRN     Nutrition Goals  No nutrition goals identified at this time     Yancy Ching, RD, LD, CCTD

## 2022-05-18 NOTE — LETTER
5/18/2022       RE: Nicolas Bustos  [address] \[facility] [place] [facility]\"" [id number]     Dear Colleague,    Thank you for referring your patient, Nicolas Bustos, [facility]. Please see a copy of my visit note below.    Palliative Care Outpatient Clinic Consultation Note    Patient: Nicolas Bustos    Chief Complaint:   Nicolas Bustos 51 year old male who is presenting to the palliative medicine clinic today for a consultation secondary to heart transplant work up. The patient's primary care provider is: Self, Referred.     History of Present Illness:    51yr old male with of history of HFrEF secondary to a NICM diagnosed in 2019 NICM, non-obstructive CAD, hypothyroidism and a history of methamphetamine use. He has been admitted several times for heart failure exacerbations and most recently his EF was 10-15% requiring ambulatory milrinone infusion. He is now being seen for LVAD evaluation with possible bridge for heart transplant.      He reports that he has been doing well over the past six months. He is a  and reports no limitations in his physically demanding work. He does notice that when his weight is up her gets more short of breath, but he is able to take additional diuretics and the symptoms resolve. He denies leg swelling, light-headedness, palpitations, syncope, or chest pain.      He finds the Milrinone infusion to be burdensome and has noticed some itching and rash around the dressing adhesive.     Patient's Disease Understanding:   -He understands that his heart doesn't pump well and that he    Coping:  He gets a lot of support from his family and extended family.     Social History  Living Situation: [place] with wife and family.   Children: 4 children and 3 grandchild  Actual/Potential Caregiver(s): family   Support System: Mother, wife, children.They are in a remote location so there  "is not much of a community.    Occupation: Ranch   Hobbies: working, playing cards,   Substance Use/History of misuse: methamphetamine use in the past   Spiritual Background: Spiritual but not Catholic. Used to sweat. Not part of a community.   Social History     Tobacco Use     Smoking status: Never Smoker     Smokeless tobacco: Former User     Types: Chew     Quit date: 01/2022   Substance Use Topics     Alcohol use: Not Currently     Drug use: Not Currently     Types: \"Crack\" cocaine, Methamphetamines     Comment: Patient quit 12/2021         Family History- Reviewed in [employer] - is now [employer].     Advance Care Planning:  Advance Directive: Has not completed ACD.   Health Care Agent Contact Information: Plans to name is Quita jhaveri    Palliative Care questions for pre-heart transplant patients:     What are your personal hopes/goals for receiving the heart transplant?   -The patient is hoping to get an LVAD as bridge to heart transplant. He wants to be able to stay alive to experience everyday life.     What are the activities/abilities that make your life worth living?   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 He enjoys this as well as spending time with his wife, mother, and children.    What does a typical day look like for you?   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     There are risks for kidney failure in patients with advancing heart disease who need a heart transplant.   What do you know about dialysis? How would your possible need for dialysis influence your quality of life?   -Patient did not have a clear understanding of dialysis prior to our conversation. I described both hemo and peritoneal dialysis. The patient feels that he would choose dialysis if the alternative was death.     The need to be on a ventilator/breathing machine through a tracheostomy (a tube in your neck) is a risk with heart transplant. What are with circumstances you would want " your medical providers and family to keep you alive with long term mechanical ventilation? The patient had heard from other doctors about the possibility of requiring a tracheostomy. He had a similar perspective to that of dialysis - that he would be willing to have a tracheostomy if he had no other alternative.     A heart transplant carries a risk of stroke which, for some people, may limit their ability to communicate their wishes to others.  After a stroke, what sort of outcomes would be unacceptable to you (ie needing to live in nursing facility, loss of independence.. Etc.)   Discussing the possibility a cognitive, expressive, or physical debility in the setting of the stroke, the patient seemed to become more reflective and introspective. He wanted to know more about the risks of complications for LVAD placement.  While he wasn't able to clearly express a quality of life that was not acceptable, he seems to be internally contemplative. I encouraged him to complete a paper advance directive and have further conversations with his family about what is important to him. I shared that it can be a tremendous gift to family to be able to have his guidance as it relates to life prolonging treatment in the setting of severe complications. The patient's wife, Quita, encouraged the patient to think about these questions to help prepare her.       No Known Allergies  Current Outpatient Medications   Medication Sig Dispense Refill     acetaminophen (TYLENOL) 500 MG tablet Take 500-1,000 mg by mouth every 6 hours as needed for mild pain       albuterol (PROAIR HFA/PROVENTIL HFA/VENTOLIN HFA) 108 (90 Base) MCG/ACT inhaler Inhale 2 puffs into the lungs every 4 hours as needed       apixaban ANTICOAGULANT (ELIQUIS) 5 MG tablet Take 5 mg by mouth 2 times daily       aspirin (ASA) 81 MG EC tablet Take 81 mg by mouth daily       furosemide (LASIX) 80 MG tablet Take 80 mg by mouth 2 times daily       isosorbide mononitrate  (IMDUR) 30 MG 24 hr tablet Take 0.5 tablets by mouth daily       magnesium oxide (MAG-OX) 400 (241.3 Mg) MG tablet Take 400 mg by mouth daily       metolazone (ZAROXOLYN) 2.5 MG tablet Take 2.5 mg by mouth daily as needed       metoprolol succinate ER (TOPROL-XL) 50 MG 24 hr tablet Take 50 mg by mouth 2 times daily       milrinone (PRIMACOR) infusion 200 mcg/mL PREMIX Inject 49.2 mcg/min into the vein continuous       omeprazole (PRILOSEC) 40 MG DR capsule Take 40 mg by mouth daily       sacubitril-valsartan (ENTRESTO) 49-51 MG per tablet Take 1 tablet by mouth 2 times daily       triamcinolone (NASACORT) 55 MCG/ACT nasal aerosol Spray 1 spray in nostril daily       levothyroxine (SYNTHROID/LEVOTHROID) 150 MCG tablet Take 150 mcg by mouth daily       potassium chloride ER (KLOR-CON M) 20 MEQ CR tablet Take 20 mEq by mouth daily as needed When taking metolazone (Patient not taking: Reported on 5/18/2022)       No past medical history on file.  No past surgical history on file.    REVIEW OF SYSTEMS:   ROS: 10 point ROS neg other than the symptoms noted above in the HPI    Physical Exam:  /76   Pulse 75   Temp 97.7  F (36.5  C) (Tympanic)   Resp 18   Ht 1.829 m (6')   Wt 98.2 kg (216 lb 9.6 oz)   SpO2 97%   BMI 29.38 kg/m    Constitutional: Alert, pleasant, no apparent distress  HEENT: No head abnormalities. Sclera non-icteric, no nasal discharge. MMM. Ears grossly normal.  Cardiovascular: RRR. Distant heart sounds. .  Respiratory: Unlabored respirations.  Clear to auscultation bilaterally.  Skin: Linear area of raised erythema along the border of the PICC line dressing   Neurologic: Gait normal. Grossly normal with intact sensation bilaterally.  Psychiatric: Mentation appears normal, affect normal/bright. Does not appear anxious or depressed.    Data Reviewed:  LABS: Normal Chem 7   IMAGING: reviewed     Images personally reviewed:     Impressions, Recommendations & Counseling:  Palliative Performance  "Score:  100  Decision Making Capacity:  Present     51yr old male with of HFrEF secondary to a NICM diagnosed in 2019 NICM, non-obstructive CAD, hypothyroidism and a history of methamphetamine use. He has been admitted several times for heart failure exacerbations and most recently his EF was 10-15% requiring ambulatory milrinone infusion. He is now being seen for LVAD evaluation with possible bridge for heart transplant.    #Pre-LVAD Evaluation   I introduced the role of palliative care team, and our role in patient support in and out of the hospital, before and after heart transplant.  He demonstrated good understanding of his heart disease and the options that have been discussed. He has strong social support from his family and good coping skills.  We talked about the complications that can rarely happen with an LVAD or transplant but can be quite devastating. These include developing chronic critical illness including needing a tracheostomy and long-term ventilation, dialysis, and/or strokes. We discussed what outcomes would be acceptable and what outcomes would not be acceptable to him and potential situations that he  would want doctors and loved ones to know that he would not want to be kept alive. The patient did not have answers to these questions but has a better understanding of the possible scenarios and plans to discuss his wishes with his wife.       We also discussed surviving the transplant but \"not recovering,\" and the various levels of that including being disabled but still being able to live in the community to having to live in a nursing home/facility the rest of his life. He was engaged in the conversation and understands the risks of the transplant, but he was hoping to get a better understanding on the complication rate. I encouraged him to pose these questions to his cardiologist who will have the most up to date information. I have no concerns from a palliative standpoint with going " forward with LVAD or heart transplant.      #Contact Dermatitis   -noted an area of contact dermatitis around the site of his PICC line dressing. RN was able to replace dressing at today's visit. I encouraged the patient to show this area of contact dermatitis to his cardiologist in there is a desire to start topical steroids.     Jian Prasad MD   Palliative Medicine Fellow   Staffed with Dr. Chen       Attending Note:  Patient seen and evaluated with Dr Prasad and I agree with/confirm their findings/recs in this note.        Thank you for involving us in the patient's care.   Shiv Chen MD / Palliative Medicine / Text me via [facility].

## 2022-05-18 NOTE — PROGRESS NOTES
Palliative Care Outpatient Clinic Consultation Note    Patient: Nicolas Bustos    Chief Complaint:   Nicolas Bustos 51 year old male who is presenting to the palliative medicine clinic today for a consultation secondary to heart transplant work up. The patient's primary care provider is: Self, Referred.     History of Present Illness:    51yr old male with of history of HFrEF secondary to a NICM diagnosed in 2019 NICM, non-obstructive CAD, hypothyroidism and a history of methamphetamine use. He has been admitted several times for heart failure exacerbations and most recently his EF was 10-15% requiring ambulatory milrinone infusion. He is now being seen for LVAD evaluation with possible bridge for heart transplant.      He reports that he has been doing well over the past six months. He is a  and reports no limitations in his physically demanding work. He does notice that when his weight is up her gets more short of breath, but he is able to take additional diuretics and the symptoms resolve. He denies leg swelling, light-headedness, palpitations, syncope, or chest pain.      He finds the Milrinone infusion to be burdensome and has noticed some itching and rash around the dressing adhesive.     Patient's Disease Understanding:   -He understands that his heart doesn't pump well and that he    Coping:  He gets a lot of support from his family and extended family.     Social History  Living Situation: [place] with [place] and family.   Children: 4 children and 3 grandchild  Actual/Potential Caregiver(s): family   Support System: Mother, wife, children.They are in a remote location so there is not much of a community.    Occupation: Ranch   Hobbies: working, playing cards,   Substance Use/History of misuse: methamphetamine use in the past   Spiritual Background: Spiritual but not Sikh. Used to sweat. Not part of a community.   Social History     Tobacco Use     Smoking status: Never  "Smoker     Smokeless tobacco: Former User     Types: Chew     Quit date: 01/2022   Substance Use Topics     Alcohol use: Not Currently     Drug use: Not Currently     Types: \"Crack\" cocaine, Methamphetamines     Comment: Patient quit 12/2021         Family History- Reviewed in Epic.     Advance Care Planning:  Advance Directive: Has not completed ACD.   Health Care Agent Contact Information: Plans to name is Quita jhaveri    Palliative Care questions for pre-heart transplant patients:     What are your personal hopes/goals for receiving the heart transplant?   -The patient is hoping to get an LVAD as bridge to heart transplant. He wants to be able to stay alive to experience everyday life.     What are the activities/abilities that make your life worth living?   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 He enjoys this as well as spending time with his wife, mother, and children.    What does a typical day look like for you?   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     There are risks for kidney failure in patients with advancing heart disease who need a heart transplant.   What do you know about dialysis? How would your possible need for dialysis influence your quality of life?   -Patient did not have a clear understanding of dialysis prior to our conversation. I described both hemo and peritoneal dialysis. The patient feels that he would choose dialysis if the alternative was death.     The need to be on a ventilator/breathing machine through a tracheostomy (a tube in your neck) is a risk with heart transplant. What are with circumstances you would want your medical providers and family to keep you alive with long term mechanical ventilation? The patient had heard from other doctors about the possibility of requiring a tracheostomy. He had a similar perspective to that of dialysis - that he would be willing to have a tracheostomy if he had no other " alternative.     A heart transplant carries a risk of stroke which, for some people, may limit their ability to communicate their wishes to others.  After a stroke, what sort of outcomes would be unacceptable to you (ie needing to live in nursing facility, loss of independence.. Etc.)   Discussing the possibility a cognitive, expressive, or physical debility in the setting of the stroke, the patient seemed to become more reflective and introspective. He wanted to know more about the risks of complications for LVAD placement.  While he wasn't able to clearly express a quality of life that was not acceptable, he seems to be internally contemplative. I encouraged him to complete a paper advance directive and have further conversations with his family about what is important to him. I shared that it can be a tremendous gift to family to be able to have his guidance as it relates to life prolonging treatment in the setting of severe complications. The patient's wife, Quita, encouraged the patient to think about these questions to help prepare her.       No Known Allergies  Current Outpatient Medications   Medication Sig Dispense Refill     acetaminophen (TYLENOL) 500 MG tablet Take 500-1,000 mg by mouth every 6 hours as needed for mild pain       albuterol (PROAIR HFA/PROVENTIL HFA/VENTOLIN HFA) 108 (90 Base) MCG/ACT inhaler Inhale 2 puffs into the lungs every 4 hours as needed       apixaban ANTICOAGULANT (ELIQUIS) 5 MG tablet Take 5 mg by mouth 2 times daily       aspirin (ASA) 81 MG EC tablet Take 81 mg by mouth daily       furosemide (LASIX) 80 MG tablet Take 80 mg by mouth 2 times daily       isosorbide mononitrate (IMDUR) 30 MG 24 hr tablet Take 0.5 tablets by mouth daily       magnesium oxide (MAG-OX) 400 (241.3 Mg) MG tablet Take 400 mg by mouth daily       metolazone (ZAROXOLYN) 2.5 MG tablet Take 2.5 mg by mouth daily as needed       metoprolol succinate ER (TOPROL-XL) 50 MG 24 hr tablet Take 50 mg by mouth 2  times daily       milrinone (PRIMACOR) infusion 200 mcg/mL PREMIX Inject 49.2 mcg/min into the vein continuous       omeprazole (PRILOSEC) 40 MG DR capsule Take 40 mg by mouth daily       sacubitril-valsartan (ENTRESTO) 49-51 MG per tablet Take 1 tablet by mouth 2 times daily       triamcinolone (NASACORT) 55 MCG/ACT nasal aerosol Spray 1 spray in nostril daily       levothyroxine (SYNTHROID/LEVOTHROID) 150 MCG tablet Take 150 mcg by mouth daily       potassium chloride ER (KLOR-CON M) 20 MEQ CR tablet Take 20 mEq by mouth daily as needed When taking metolazone (Patient not taking: Reported on 5/18/2022)       No past medical history on file.  No past surgical history on file.    REVIEW OF SYSTEMS:   ROS: 10 point ROS neg other than the symptoms noted above in the HPI    Physical Exam:  /76   Pulse 75   Temp 97.7  F (36.5  C) (Tympanic)   Resp 18   Ht 1.829 m (6')   Wt 98.2 kg (216 lb 9.6 oz)   SpO2 97%   BMI 29.38 kg/m    Constitutional: Alert, pleasant, no apparent distress  HEENT: No head abnormalities. Sclera non-icteric, no nasal discharge. MMM. Ears grossly normal.  Cardiovascular: RRR. Distant heart sounds. .  Respiratory: Unlabored respirations.  Clear to auscultation bilaterally.  Skin: Linear area of raised erythema along the border of the PICC line dressing   Neurologic: Gait normal. Grossly normal with intact sensation bilaterally.  Psychiatric: Mentation appears normal, affect normal/bright. Does not appear anxious or depressed.    Data Reviewed:  LABS: Normal Chem 7   IMAGING: reviewed     Images personally reviewed:     Impressions, Recommendations & Counseling:  Palliative Performance Score:  100  Decision Making Capacity:  Present     51yr old male with of HFrEF secondary to a NICM diagnosed in 2019 NICM, non-obstructive CAD, hypothyroidism and a history of methamphetamine use. He has been admitted several times for heart failure exacerbations and most recently his EF was 10-15%  "requiring ambulatory milrinone infusion. He is now being seen for LVAD evaluation with possible bridge for heart transplant.    #Pre-LVAD Evaluation   I introduced the role of palliative care team, and our role in patient support in and out of the hospital, before and after heart transplant.  He demonstrated good understanding of his heart disease and the options that have been discussed. He has strong social support from his family and good coping skills.  We talked about the complications that can rarely happen with an LVAD or transplant but can be quite devastating. These include developing chronic critical illness including needing a tracheostomy and long-term ventilation, dialysis, and/or strokes. We discussed what outcomes would be acceptable and what outcomes would not be acceptable to him and potential situations that he  would want doctors and loved ones to know that he would not want to be kept alive. The patient did not have answers to these questions but has a better understanding of the possible scenarios and plans to discuss his wishes with his wife.       We also discussed surviving the transplant but \"not recovering,\" and the various levels of that including being disabled but still being able to live in the community to having to live in a nursing home/facility the rest of his life. He was engaged in the conversation and understands the risks of the transplant, but he was hoping to get a better understanding on the complication rate. I encouraged him to pose these questions to his cardiologist who will have the most up to date information. I have no concerns from a palliative standpoint with going forward with LVAD or heart transplant.      #Contact Dermatitis   -noted an area of contact dermatitis around the site of his PICC line dressing. RN was able to replace dressing at today's visit. I encouraged the patient to show this area of contact dermatitis to his cardiologist in there is a desire to " start topical steroids.     Jian Prasad MD   Palliative Medicine Fellow   Staffed with Dr. Chen       Attending Note:  Patient seen and evaluated with Dr Prasad and I agree with/confirm their findings/recs in this note.      Thank you for involving us in the patient's care.   Shiv Chen MD / Palliative Medicine / Text me via [facility].

## 2022-05-19 LAB
CYSTATIN C SERPL-MCNC: 0.9 MG/L
DLCOCOR-%PRED-PRE: 99 %
DLCOCOR-PRE: 29.96 ML/MIN/MMHG
DLCOUNC-%PRED-PRE: 98 %
DLCOUNC-PRE: 29.88 ML/MIN/MMHG
DLCOUNC-PRED: 30.2 ML/MIN/MMHG
ERV-%PRED-PRE: 94 %
ERV-PRE: 1.21 L
ERV-PRED: 1.29 L
EXPTIME-PRE: 6.5 SEC
FEF2575-%PRED-PRE: 144 %
FEF2575-PRE: 5.1 L/SEC
FEF2575-PRED: 3.53 L/SEC
FEFMAX-%PRED-PRE: 98 %
FEFMAX-PRE: 9.87 L/SEC
FEFMAX-PRED: 10.01 L/SEC
FEV1-%PRED-PRE: 108 %
FEV1-PRE: 4.36 L
FEV1FEV6-PRE: 84 %
FEV1FEV6-PRED: 80 %
FEV1FVC-PRE: 84 %
FEV1FVC-PRED: 78 %
FEV1SVC-PRE: 82 %
FEV1SVC-PRED: 74 %
FIFMAX-PRE: 6.79 L/SEC
FRCPLETH-%PRED-PRE: 92 %
FRCPLETH-PRE: 3.36 L
FRCPLETH-PRED: 3.64 L
FVC-%PRED-PRE: 100 %
FVC-PRE: 5.17 L
FVC-PRED: 5.15 L
GAMMA INTERFERON BACKGROUND BLD IA-ACNC: 0.02 IU/ML
GFR/BSA.PRED SERPLBLD CYS-BASED-ARV: 92 ML/MIN/BSA
IC-%PRED-PRE: 98 %
IC-PRE: 4.08 L
IC-PRED: 4.13 L
M TB IFN-G BLD-IMP: NEGATIVE
M TB IFN-G CD4+ BCKGRND COR BLD-ACNC: 9.98 IU/ML
MITOGEN IGNF BCKGRD COR BLD-ACNC: 0.01 IU/ML
MITOGEN IGNF BCKGRD COR BLD-ACNC: 0.02 IU/ML
PLPETH BLD-MCNC: <10 NG/ML
POPETH BLD-MCNC: <10 NG/ML
RVPLETH-%PRED-PRE: 92 %
RVPLETH-PRE: 2.15 L
RVPLETH-PRED: 2.32 L
TLCPLETH-%PRED-PRE: 100 %
TLCPLETH-PRE: 7.44 L
TLCPLETH-PRED: 7.43 L
VA-%PRED-PRE: 101 %
VA-PRE: 7.07 L
VC-%PRED-PRE: 97 %
VC-PRE: 5.29 L
VC-PRED: 5.42 L

## 2022-05-19 NOTE — PROGRESS NOTES
NAME  Nicolas Bustos    MRN  [id number]      68     AGE  51     SEX  Male     HANDEDNESS Right     EDUCATION 12     VEGAS  22     PROVIDER  [employer]  SW     STATION  OP            ORIENTATION      Time  0     Place      Personal Info.     Presidents                           WRAT   5     SS %ile GE   Word Reading 96 39 >12.9          WASI-II       FSIQ: 98        Raw T    Vocabulary  32 42    Matrix Reasoning 22 56           WAIS-IV         Raw SSa    Coding  40 5            COWAT   FAS   Raw 17      SS 4      T 28              BOSTON NAMING TEST     Raw 54 /60     SS 9      T 44             FINGER TAPPING       Avg SSa T    RH 48 9 44    LH 41 8 40           TRAIL MAKING TEST       Time Errors SSa T   A 52 3 6 35   B 71 0 10 51          TSAT        Total z     Time 60 1     Errors 3 -0.28             WMS-IV LOGICAL MEMORY YA    Raw SSa %ile    LM I 19 7     LM II 11 6     LM Rec. 23  26-50    VR I 26 5     VR II 6 4     VR Rec. 4  17-25            AVLT 30-91   1   T1 T2 T3 T4 T5   4 7 8 8 8   TB T6 30'     1 7 4       Raw T %ile   Trials 1-5  35 41 17-31   Short Delay Recall 7 45 17-31   30' Recall  4 38 8-16   30' Recog. Hits/FPs  44 17-31   Mem. Efficiency 1.3 40 8-16          BDI-II       Raw 7      Interp. Minimal             PHYLLIS       Raw 2      Interp. Minimal             MMPI-3       RCd 45 CRIN 51    RC1 50 VRIN 55    RC2 42 GIUSEPPE 54 T    RC4 58 F 44    RC6 50 Fp 50    RC7 52 Fs 42    RC8 44 FBS 48    RC9 67 RBS 50      L 44      K 56           ARTURO-O COMPLEX FIGURE       Raw T %ile   Time to Copy 184  >16   Copy  34  >16

## 2022-05-19 NOTE — PROGRESS NOTES
NAME: Nicolas Bustos  MRN: [id number]  : 1970  VEGAS: 2022  [facility] [place] [address]      NEUROPSYCHOLOGICAL EVALUATION    RELEVANT HISTORY AND REASON FOR REFERRAL    This is a report of neuropsychological consultation regarding Nicolas Bustos, a 51-year-old, right-handed man with 12 years of formal education. His medical history includes heart failure with reduced ejection fraction, nonischemic cardiomyopathy, nonobstructive coronary artery disease, hypothyroidism with Hashimoto s thyroiditis, recently remitted tobacco dependence, and recently remitted methamphetamine abuse. He is being considered for advanced therapies for heart failure, such as LVAD and transplant. Dr. Renzo Sahni ordered this neuropsychological evaluation of brain functioning as part of the standard pre-procedure protocol, to better understand cognitive and psychosocial factors that affect LVAD/transplant candidacy.    Mr. Bustos was diagnosed with heart failure in . He was hospitalized in 2021 with worsening symptoms secondary to noncompliance. He had not been taking his medications for about 6 months. Drug screens were positive for methamphetamines and benzodiazepines. He continued using methamphetamines through , and his last reported use was in 2021. In records from the [facility] system, toxicology screens have been negative in January, February, March, and 2022. Unfortunately, there was a positive amphetamine screening from labs obtained earlier today.    He tells me that his meth use began in , in the context of participating in [employer]. He then used it off and on until around . 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 He quit completely in  for about 11 to 12 years--notably, this was in the context of incarceration for 10 of those years--and then he restarted again  around 2018. In 2021, he started using it more frequently to try and overcome the fatigue brought on by his heart failure. He reports using a couple of times per week throughout last year. He reports no use since December 2021. He says there were no withdrawal symptoms, and he is  not really  having any cravings for it. He denies any failure to meet responsibilities because he was using instead. Rather, he describes meth use as a way to gain energy so that he could meet his work responsibilities. In the last year, there has been extra stress in his marriage and interpersonal issues because of his meth use. He denies any recent legal consequences. He went through a court-ordered chemical dependency treatment program in 2006. He also served 10 years in senior care starting in 2006, on conspiracy charges related to methamphetamine possession. He says there are no outstanding legal issues at this time. He has been connected to [employer] groups, but his attendance is sporadic because the nearest meeting is 37 miles from his home.     He reports no history of alcohol problems. He says his last use of alcohol was around Christmas of 2021. He describes a typical habit of only drinking a few times per year, and only 1-2 drinks on occasion (AUDIT-C = 1, lower risk). PEth labs were collected today and are in process.     He used chewing tobacco beginning when he was 10 or 11 years old. He says that he quit either in December 2021 or January 2022. He does not use any smoking tobacco or vaping products. I do not see any cotinine labs in our records.     He reports no history of problematic gambling behaviors or any other addictive/compulsive style behaviors.    Mr. Bustos denies any concerns about mental health, currently or historically. He denies any issues with depression, anxiety, manic/hypomanic periods, obsessive-compulsive disorder, disordered eating behaviors, trauma/abuse, hallucinatory experiences, or suicidal ideation. He says  he has never taken psychiatric medications, participated in psychotherapy, or had a psychiatric hospitalization.    He demonstrates an appropriate understanding of his cardiac condition and reasons for considering advanced therapies. He says he has been told that if he does not go through with advanced therapies like LVAD or transplant, then he is unlikely to live through 2022. He is currently treated with milrinone, and he is feeling really good on it. 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 His hope for LVAD is that it serves as a bridge to transplant. He is aware that he faces risks including strokes, clotting, and trouble getting off of the respirator after the surgery.    He lives at home with his wife, Quita, [place] [facility]. They have been together for 20 years and  for the last 3 years. He had another marriage that ended in divorce about 25-30 years ago. He has one son, and his wife brought three daughters to the marriage. A daughter and granddaughter are temporarily living with them. He is aware of the need to relocate to the [place] area for approximately 30 days after the surgery. He is aware of the 30-day caregiver requirement. He says that his wife will be able to do it, and he feels like they have a good plan in place.    He does not perceive any cognitive difficulties, but he notes that his wife says he is forgetful. They share financial management at home, and he denies any trouble with his portion of it. He says that he manages his medications independently and without any difficulty. He has not seen any need for changes in his driving habits or other aspects of instrumental ADLs. He does not need any assistance with basic ADLs.    He has a high school education. He served in [employer] for 3-1/2 years after high school, in the role of . 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    Head CT obtained today was unremarkable. Reported neurologic history is fairly unremarkable. He reports a couple of instances of horses kicking him in the head or throwing him and briefly losing consciousness, but no events prompted him to seek out medical attention or had any noticeable functional sequelae. There is no history of seizure, stroke, balance/coordination troubles, unilateral weakness or numbness, tremor/parkinsonism, or migraine. He has not perceived any changes to his basic senses of smell, taste, hearing, or vision.    He reports no history of COVID-19 infection.    Reported family includes heart disease for a brother and grandfather. He is not aware of any history of substance use disorders or psychiatric disorders in the family.    His current medications include acetaminophen, albuterol, apixaban, 81 mg aspirin, furosemide, isosorbide mononitrate, levothyroxine, magnesium oxide, metolazone, metoprolol, milrinone, omeprazole, potassium chloride, sacubitril/valsartan, and triamcinolone.    BEHAVIORAL OBSERVATIONS    Mr. Bustos was polite and cooperative with the evaluation. He appeared to be open and candid in the interview, and he provided a history generally consistent with information in the medical records. There were no indications of aphasia in his speech production or language comprehension. Thought processes were linear and goal-directed. During the testing session, he was quite tired. He reported only sleeping 2 hours the night before. He started dozing off during some tasks. He declined breaks earlier in the appointment but took one break later on. He was somewhat hesitant to provide guesses, but he appeared to put forth appropriate effort, and he persisted well with the requested procedures. There might be some effects from his acute sleepiness, but the test results are seen as reasonable estimations  of his cognitive abilities.    MEASURES ADMINISTERED    The following measures were administered by a trained psychometrist, under my supervision:    Orientation: Time, Place, Basic Personal Information, Recent US Presidents; Wide Range Achievement Test 5: Word Reading; Wechsler Abbreviated Scales of Intelligence-2: Vocabulary, Matrix Reasoning; Wechsler Adult Intelligence Scale-IV: Coding; Controlled Oral Word Association Test; [place] Naming Test; Emilia Visual Acuity Screen; Finger Tapping; Trail Making Test; Test of Sustained Attention & Tracking; Jason-Osterrieth Complex Figure Test; Jason Auditory Verbal Learning Test; Wechsler Memory Scale-IV: Logical Memory, Visual Reproduction; Edwards Depression Inventory-II; Edwards Anxiety Inventory; [place] Multiphasic Personality Inventory-3.    RESULTS AND INTERPRETATION    Orientation: Orientation was normal for time, place, and basic personal information. He was able to name 4 of the 6 most recent [place] presidents.     Intellectual Abilities: Performance on a reading and pronunciation test that is validated for estimating premorbid intelligence was average. Demonstrating vocabulary knowledge was low average. Visual reasoning through pattern identification was upper average.     Language & Related Skills: As noted above, basic reading and pronunciation skills were average. Confrontation naming was average. Letter-based verbal fluency was mildly below average.    Visual Perceptual & Constructional Skills: Binocular, uncorrected, near-point visual acuity was 20/30 on Emilia screening. Copying a complex geometric figure was average.     Motor Skills: Speeded finger tapping was average with the dominant right hand and low average with the nondominant left hand.     Mental Speed & Executive Functioning: As noted above, speeded verbal fluency performances were below average. Performances were average across a variety of brief verbal tasks requiring executive management of  attention and concentration. Cognitive processing speed was below average on a timed transcription task. Visual scanning and graphomotor sequencing under simple conditions was below average for speed and had 3 errors. In surprising contrast, scanning and sequencing under greater executive demands to control divided attention was average for speed and had no errors.     Learning & Anterograde Memory: Immediate verbal memory for short stories was low average, and delayed story recall was low average. Delayed recognition of story details was average. Learning a word list over repeated readings was low average to average. Delayed free recall of the list was low average, and delayed recognition of the list was low average. Immediate memory for simple designs was below average. Delayed recall of the simple designs was below average, and recognition of them was low average.     Emotional Functioning: On brief self-report inventories, he endorsed minimal symptoms related to depression (BDI-II = 7) and anxiety (PHYLLIS = 2). His response set to a longer questionnaire for objective assessment of personality functioning and emotional coping patterns (MMPI-3) was technically valid and interpretable, with no abnormal findings among the response validity scales. There was a single elevation among the core clinical scales. He endorsed behaviors and experiences associated with hypomanic activation, such as excitability, impulsivity, and elevated mood. He is likely to be restless and easily bored. He may demonstrate poor impulse control, sensation-seeking, risk-taking, and other forms of under-controlled behavior. Mood regulation issues may be present, with instability, aggression, euphoria, and excitability. Among the various supplemental scales, he reported below-average levels of stress, worry, and anxiety. He described himself as having strong opinions, standing up for himself, being assertive and direct, and being able to lead  others. He may see himself as a strong leader, but others may see him as overly assertive and domineering. He reported engaging in instrumentally aggressive behavior. The profile raises concerns for bipolar mood disorders with manic/hypomanic episodes, as well as personality disorders involving antagonistic behaviors such as narcissistic and antisocial. The MMPI-3 does not independently render psychiatric diagnoses. It raises clinical hypotheses for further exploration in the context of mental health treatment.    IMPRESSIONS AND RECOMMENDATIONS    The cognitive test results are minimally abnormal. In the context of very little sleep the night before and falling asleep at one point during the testing, today s data only show some variability in attention, concentration, and mental speed. I am not seeing evidence of a cognitive disorder that would interfere with medical decision making or with learning and carrying out postsurgical care. His prior history of medical noncompliance is not explained by cognitive dysfunction; it is reasonably attributable to concurrent substance abuse and personality factors.     His MMPI-3 profile is consistent with the known history of substance abuse and legal troubles. With the history of amphetamine abuse, I think Mr. Bustos fits the LVAD/transplant committee s classification of category 2 substance abuse. He began abusing methamphetamines in 1999. There was a decade of incarceration for meth-related charges starting in 2006, and he went through a court-ordered CD program. He remained abstinent until about [id number] then relapsed. He was diagnosed with heart failure in 2019. His approach to heart failure s growing fatigue/stamina problems over 2021 was to increase his frequency of meth use. He reports no use since December 2021. Outside records showed monthly negative tox screens from January to April, but the screening obtained this morning came back positive for amphetamines.  Unfortunately, if this is a confirmable finding, it would reset his clock for accumulating enough documented abstinence to be immediately eligible for LVAD. I recommend additional consultation with the Addiction Medicine team or referral for a formal CD evaluation. He is likely to require additional clinical support to maintain sobriety.     Kervin Estevez, PhD, LP, ABPP-CN  Board Certified in Clinical Neuropsychology  Licensed Psychologist [id number]      Time spent: One unit psychiatric evaluation including records review, interview, and clinical assessment licensed and board-certified neuropsychologist (CPT 76354). 102 minutes neuropsychological testing evaluation by licensed and board-certified neuropsychologist, including integration of patient data, interpretation of standardized test results and clinical data, clinical decision-making, treatment planning, report, and interactive feedback to the patient (CPT 83625, 79069). 220 minutes of psychological and neuropsychological test administration and scoring by technician (CPT 39370, 09655). Diagnoses: F15.10, I50.22, F54, Z01.818

## 2022-05-23 LAB
A*: NORMAL
A*LOCUS SEROLOGIC EQUIVALENT: 11
A*LOCUS: NORMAL
A*SEROLOGIC EQUIVALENT: 68
ABTEST METHOD: NORMAL
B*: NORMAL
B*LOCUS SEROLOGIC EQUIVALENT: 62
B*LOCUS: NORMAL
B*SEROLOGIC EQUIVALENT: 60
BW-1: NORMAL
C*: NORMAL
C*LOCUS SEROLOGIC EQUIVALENT: 9
C*LOCUS: NORMAL
C*SEROLOGIC EQUIVALENT: 10
DPA1*: NORMAL
DPB1*: NORMAL
DQA1*: NORMAL
DQA1*LOCUS: NORMAL
DQB1*LOCUS NMDP: NORMAL
DQB1*LOCUS SEROLOGIC EQUIVALENT: 7
DQB1*LOCUS: NORMAL
DRB1*: NORMAL
DRB1*LOCUS SEROLOGIC EQUIVALENT: 4
DRB1*LOCUS: NORMAL
DRB1*SEROLOGIC EQUIVALENT: 11
DRB3*LOCUS SEROLOGIC EQUIVALENT: 52
DRB3*LOCUS: NORMAL
DRB4*: NORMAL
DRB4*SEROLOGIC EQUIVALENT: 53
DRSSO TEST METHOD: NORMAL

## 2022-05-24 LAB
SA 1 CELL: NORMAL
SA 1 TEST METHOD: NORMAL
SA 2 CELL: NORMAL
SA 2 TEST METHOD: NORMAL
SA1 HI RISK ABY: NORMAL
SA1 MOD RISK ABY: NORMAL
SA2 HI RISK ABY: NORMAL
SA2 MOD RISK ABY: NORMAL
UNACCEPTABLE ANTIGENS: NORMAL
UNOS CPRA: 0
ZZZSA 1  COMMENTS: NORMAL
ZZZSA 2 COMMENTS: NORMAL

## 2022-05-26 ENCOUNTER — VIRTUAL VISIT (OUTPATIENT)
Dept: CARDIOLOGY | Facility: CLINIC | Age: 52
End: 2022-05-26
Attending: INTERNAL MEDICINE
Payer: COMMERCIAL

## 2022-05-26 DIAGNOSIS — I50.22 CHRONIC SYSTOLIC CONGESTIVE HEART FAILURE (H): Primary | ICD-10-CM

## 2022-05-26 PROCEDURE — 99204 OFFICE O/P NEW MOD 45 MIN: CPT | Mod: 95 | Performed by: THORACIC SURGERY (CARDIOTHORACIC VASCULAR SURGERY)

## 2022-05-26 NOTE — PROGRESS NOTES
[facility] CARDIOTHORACIC SURGERY CONSULT  Patient Name: Nicolas Bustos  Medical Record Number: [id number]  YOB: 1970  Room Number: Room/bed info not found  Referring Physician: Dr. Sahni    CC: Heart failure - eval for heart transplant vs LVAD    History of Present Illness: Nicolas Bustos is a 52 yr old male with of HFrEF, NICM, mild non-obstructive CAD and hypothyroidism. He was initially diagnosed with HF in 11/2019. EF at that time was 25-30% and angiogram showed minimal, non-obstructive CAD. EF remained low at 30%. ICD was apparently declined by patient at the time. Then he was admitted in 02/2021 with HF exacerbation secondary to non-compliance and being off medications for the previous 6 months. Echo showed LVEF of 30% and he was diuresed from 216 lbs to 206 lbs. Then he was readmitted 2/28-3/2/2021 with HF exacerbation. Was again diuresed from a weight of 215 lbs down to 203 lbs at the time of discharge. Repeat echo at he time showed further decrement in EF down to 10-15%. HF medications were restarted. Of note, UDS was positive for methamphetamines. He was seen in clinic recently and noted advanced Class III symptoms at the time in addition to bloating, fatigue and VEGAS to 1/2 block at the time. Weight was up to 214 lbs. He was scheduled to undergo ICD implantation on 4/13/21 yet this was postponed due to having been diagnosed with PNA. I saw him once back in 4/2021.   He did have several hospital admissions, most recently on 1/31/2021 for n/v and increased sob. Wt at home 202-203 lb. BNP 2763 on admission.  Where he was initially discharged on 0.125 of milrinone he had significant symptoms of the way home came back and milrinone was increased to 0.5 mcg/kg/min. 2/1/2022 echo showed decrement in LVEF to 10-15%, RV moderately dilated, mod MR, LVEDd 6 cm. Underwent a RHC 2/1/2022 showed Adolfo CI 1.7, PW 26. Complicated by atrial tachycardia during procedure with rates up to 130s thus started on  "digoxin load. Diuresed 5.4 L during admission with d/c wt 198 lb. Continued milrinone at 0.5 mcg/kg/min and lasix 40 mg twice daily at discharge.     He was sent to me for surgical evaluation for transplant vs LVAD.  He is evaluated today by video visit.      Assessment and Plan:  Nicolas Bustos is a 52 year old male with nonischemic cardiomyopathy  1) Will discuss his candidacy for therapy with the heart failure team.  His recent history of drug use is concerning for long-term sobriety.  2) He is appropriate surgical candidate outside of his chemical dependency.   3) Please call with questions or concerns.     Thank you for the opportunity to participate in the care of this patient.    Yury Morillo MD  Cardiothoracic Surgery  [phone]    Past Medical History:    CHF (congestive heart failure) ([facility]) 2019     Coronary artery disease     Hashimoto's disease     Hypothyroidism     Past Surgical History:  None     Family History:   No family history on file.    Social History:  Social History     Socioeconomic History     Marital status: Single     Spouse name: Not on file     Number of children: Not on file     Years of education: Not on file     Highest education level: Not on file   Occupational History     Not on file   Tobacco Use     Smoking status: Never Smoker     Smokeless tobacco: Former User     Types: Chew     Quit date: 01/2022   Substance and Sexual Activity     Alcohol use: Not Currently     Drug use: Not Currently     Types: \"Crack\" cocaine, Methamphetamines     Comment: Patient quit 12/2021     Sexual activity: Not on file   Other Topics Concern     Not on file   Social History Narrative     Not on file     Social Determinants of Health     Financial Resource Strain: Not on file   Food Insecurity: Not on file   Transportation Needs: Not on file   Physical Activity: Not on file   Stress: Not on file   Social Connections: Not on file   Intimate Partner Violence: Not on file   Housing Stability: " Not on file       Allergies:   No Known Allergies    Medications:  Current Outpatient Medications   Medication     acetaminophen (TYLENOL) 500 MG tablet     apixaban ANTICOAGULANT (ELIQUIS) 5 MG tablet     aspirin (ASA) 81 MG EC tablet     furosemide (LASIX) 80 MG tablet     isosorbide mononitrate (IMDUR) 30 MG 24 hr tablet     magnesium oxide (MAG-OX) 400 (241.3 Mg) MG tablet     metolazone (ZAROXOLYN) 2.5 MG tablet     milrinone (PRIMACOR) infusion 200 mcg/mL PREMIX     omeprazole (PRILOSEC) 40 MG DR capsule     potassium chloride ER (KLOR-CON M) 20 MEQ CR tablet     sacubitril-valsartan (ENTRESTO) 49-51 MG per tablet     triamcinolone (NASACORT) 55 MCG/ACT nasal aerosol     albuterol (PROAIR HFA/PROVENTIL HFA/VENTOLIN HFA) 108 (90 Base) MCG/ACT inhaler     levothyroxine (SYNTHROID/LEVOTHROID) 150 MCG tablet     metoprolol succinate ER (TOPROL-XL) 50 MG 24 hr tablet     No current facility-administered medications for this visit.       Review of Systems:   A 10 point ROS was performed and is negative other than HPI.    Physical Exam:  Unable to perform given video evaluation    Labs:  Lab Results   Component Value Date    WBC 4.7 05/17/2022     Lab Results   Component Value Date    RBC 4.96 05/17/2022     Lab Results   Component Value Date    HGB 14.5 05/17/2022     Lab Results   Component Value Date    HCT 45.1 05/17/2022     No components found for: MCT  Lab Results   Component Value Date    MCV 91 05/17/2022     Lab Results   Component Value Date    MCH 29.2 05/17/2022     Lab Results   Component Value Date    MCHC 32.2 05/17/2022     Lab Results   Component Value Date    RDW 14.6 05/17/2022     Lab Results   Component Value Date     05/17/2022     Last Comprehensive Metabolic Panel:  Sodium   Date Value Ref Range Status   05/17/2022 142 133 - 144 mmol/L Final     Potassium   Date Value Ref Range Status   05/17/2022 3.6 3.4 - 5.3 mmol/L Final     Chloride   Date Value Ref Range Status   05/17/2022 109 94  - 109 mmol/L Final     Carbon Dioxide (CO2)   Date Value Ref Range Status   2022 26 20 - 32 mmol/L Final     Anion Gap   Date Value Ref Range Status   2022 7 3 - 14 mmol/L Final     Glucose   Date Value Ref Range Status   2022 98 70 - 99 mg/dL Final     Urea Nitrogen   Date Value Ref Range Status   2022 13 7 - 30 mg/dL Final     Creatinine   Date Value Ref Range Status   2022 0.96 0.66 - 1.25 mg/dL Final     GFR Estimate   Date Value Ref Range Status   2022 >90 >60 mL/min/1.73m2 Final     Comment:     Effective 2021 eGFRcr in adults is calculated using the  CKD-EPI creatinine equation which includes age and gender (Kade et al., NEJM, DOI: 10.1056/IIGMrj6025688)     Calcium   Date Value Ref Range Status   2022 8.7 8.5 - 10.1 mg/dL Final       Imaging:  Echo 3/1/2021: LVEF 10-15%; LVEDd 6.2 cm; moderate MR   Left Ventricle: Severely reduced left ventricular systolic function.  The EF is visually estimated to be 10-15%.     Left Ventricle: There is mild concentric hypertrophy.     Right Ventricle: The right ventricle is mildly dilated.     Mitral Valve: There is moderate regurgitation.     Tricuspid Valve: There is mild regurgitation. RVSP measurin.8      Coronary angiogram 2019:  Minimal non-obstructive coronary artery disease  Non-ischemic cardiomyopathy

## 2022-05-26 NOTE — Clinical Note
5/26/2022      RE: Nicolas Bustos  [address] [facility] [address]       Dear Colleague,    Thank you for the opportunity to participate in the care of your patient, Nicolas Bustos, at the [facility]. Please see a copy of my visit note below.    No notes on file    Please do not hesitate to contact me if you have any questions/concerns.     Sincerely,     Yury Morillo MD

## 2022-05-26 NOTE — NURSING NOTE
Chief Complaint   Patient presents with     Follow Up     In [place] for appt today, Pt states appt was rescheduled from last week, no questions while rooming, pt states everything is up to date since last reviewed last week, no vitals to report today, not able to find new preferred pharmacy in system ([facility])     Patient denies any changes regarding medication and allergies and states all information remains accurate since last reviewed.    Gabbie Rodriguez, VF/CMA

## 2022-05-29 ENCOUNTER — HEALTH MAINTENANCE LETTER (OUTPATIENT)
Age: 52
End: 2022-05-29

## 2022-06-02 ENCOUNTER — MYC MEDICAL ADVICE (OUTPATIENT)
Dept: CARDIOLOGY | Facility: CLINIC | Age: 52
End: 2022-06-02
Payer: COMMERCIAL

## 2022-06-03 ENCOUNTER — CARE COORDINATION (OUTPATIENT)
Dept: CARDIOLOGY | Facility: CLINIC | Age: 52
End: 2022-06-03
Payer: COMMERCIAL

## 2022-06-03 DIAGNOSIS — I50.22 CHRONIC SYSTOLIC CONGESTIVE HEART FAILURE (H): Primary | ICD-10-CM

## 2022-06-06 ENCOUNTER — TRANSFERRED RECORDS (OUTPATIENT)
Dept: CARDIOLOGY | Facility: OTHER | Age: 52
End: 2022-06-06

## 2022-07-12 ENCOUNTER — TRANSFERRED RECORDS (OUTPATIENT)
Dept: HEALTH INFORMATION MANAGEMENT | Facility: CLINIC | Age: 52
End: 2022-07-12

## 2022-07-29 ENCOUNTER — MYC MEDICAL ADVICE (OUTPATIENT)
Dept: CARDIOLOGY | Facility: OTHER | Age: 52
End: 2022-07-29

## 2022-07-29 NOTE — TELEPHONE ENCOUNTER
Called and spoke with patient who states he had labs drawn on 6/6/22 at [facility] and did not see the results in his Mychart yet. Reviewed patient's chart and right fax and could not find the results. Called [facility] HIM department and they state that patient did have labs done on 6/6/22 and that they will fax the results to Dr. Sahni at [phone]. Called patient back and notified. Awaiting fax.

## 2022-08-10 ENCOUNTER — OFFICE VISIT (OUTPATIENT)
Dept: CARDIOLOGY | Facility: OTHER | Age: 52
End: 2022-08-10
Payer: COMMERCIAL

## 2022-08-10 DIAGNOSIS — I10 BENIGN ESSENTIAL HYPERTENSION: Primary | ICD-10-CM

## 2022-08-10 DIAGNOSIS — I50.20 HEART FAILURE WITH REDUCED EJECTION FRACTION, NYHA CLASS III (H): ICD-10-CM

## 2022-08-10 DIAGNOSIS — E78.2 MIXED HYPERLIPIDEMIA: ICD-10-CM

## 2022-08-10 DIAGNOSIS — I42.8 NONISCHEMIC CARDIOMYOPATHY (H): ICD-10-CM

## 2022-08-10 PROCEDURE — 99215 OFFICE O/P EST HI 40 MIN: CPT | Performed by: INTERNAL MEDICINE

## 2022-08-10 NOTE — LETTER
8/10/2022      RE: Nicolas Bustos  [id number] [facility] [address]       Dear Colleague,    Thank you for the opportunity to participate in the care of your patient, Nicolas Bustos, at the [facility] [place] at [facility]. Please see a copy of my visit note below.    August 10, 2022     I had the pleasure of seeing Nicolas Bustos in clinic today. As you know he is a 51yr old male with of HFrEF, NICM, mild non-obstructive CAD and hypothyroidism. He was initially diagnosed with HF in 11/2019. EF at that time was 25-30% and angiogram showed minimal, non-obstructive CAD. EF remained low at 30%. ICD was apparently declined by patient at the time. Then he was admitted in 02/2021 with HF exacerbation secondary to non-compliance and being off medications for the previous 6 months. Echo showed LVEF of 30% and he was diuresed from 216 lbs to 206 lbs. Then he was readmitted 2/28-3/2/2021 with HF exacerbation. Was again diuresed from a weight of 215 lbs down to 203 lbs at the time of discharge. Repeat echo at he time showed further decrement in EF down to 10-15%. HF medications were restarted. Of note, UDS was positive for methamphetamines. He was seen in clinic recently and noted advanced Class III symptoms at the time in addition to bloating, fatigue and VEGAS to 1/2 block at the time. Weight was up to 214 lbs. He was scheduled to undergo ICD implantation on 4/13/21 yet this was postponed due to having been diagnosed with PNA. I saw him once back in 4/2021.   He did have several hospital admissions, most recently on 1/31/2021 for n/v and increased sob. Wt at home 202-203 lb. BNP 2763 on admission.  Where he was initially discharged on 0.125 of milrinone he had significant symptoms of the way home came back and milrinone was increased to 0.5 mcg/kg/min. 2/1/2022 echo showed decrement in LVEF to 10-15%, RV moderately dilated, mod MR, LVEDd 6 cm.  Underwent a RHC 2/1/2022 showed Adolfo CI 1.7, PW 26. Complicated by atrial tachycardia during procedure with rates up to 130s thus started on digoxin load. Diuresed 5.4 L during admission with d/c wt 198 lb. Continued milrinone at 0.5 mcg/kg/min and lasix 40 mg twice daily at discharge.   He did.  We will evaluation for LVAD which he essentially completed however his drug test was positive For methamphetamines at that time.  Please note that he did have repeat test here that were actually negative.  He did see Dr. Henley recently in heart failure clinic where he reported that the PICC line essentially came out the day prior to his visit he noted that he did not feel any different after the inotrope was stopped for about 24 hours.  He also noted that he could not get the colonoscopy done as part of the blood work-up.  Again reiterated that he knows how sick he is however he felt better after the inotrope was stopped.  Echo was repeated on 8/1/2022 which actually showed an ejection fraction of 25% with symptoms have been a little bit better than 15% with the inotrope.  Today he is here for follow-up.  Please note that we have been trying to recently obtain insurance approval for potential LVAD implantation however the [facility] did has not approved this yet.  We have discussed this with patient's primary care provider and referrals are elevated.  Also he needs a dental and a colonoscopy done.  Note positive drug test during our screening. He was also positive for cotinine recently in [place].   We will do changes as above including stepping milrinone as the PICC line came out he actually feels okay.  He notes that he does not have any shortness of breath no dizziness lightheadedness no chest pain no palpitations.  He feels that he is doing just about the same as he did with the milrinone support.  Otherwise denies any complaints no new issues.     Past Medical History:     CHF (congestive heart  failure) ([facility]) 2019     Coronary artery disease     Hashimoto's disease     Hypothyroidism     Social History   Socioeconomic History     Marital status:    Spouse name: Quita Bustos     Number of children: Not on file     Years of education: Not on file     Highest education level: Not on file   Occupational History     Not on file   Social Needs     Financial resource strain: Not on file     Food insecurity   Worry: Not on file   Inability: Not on file     Transportation needs   Medical: Not on file   Non-medical: Not on file   Tobacco Use     Smoking status: Never Smoker     Smokeless tobacco: Current User   Types: Chew   Substance and Sexual Activity     Alcohol use: Yes   Frequency: Monthly or less   Drinks per session: 1 or 2   Binge frequency: Less than monthly     Drug use: Never     Sexual activity: Yes   Partners: Female   Lifestyle     Physical activity   Days per week: Not on file   Minutes per session: Not on file     Stress: Not on file   Relationships     Social connections   Talks on phone: Not on file   Gets together: Not on file   Attends Holiness service: Not on file   Active member of club or organization: Not on file   Attends meetings of clubs or organizations: Not on file   Relationship status: Not on file     Intimate partner violence   Fear of current or ex partner: Not on file   Emotionally abused: Not on file   Physically abused: Not on file   Forced sexual activity: Not on file   Other Topics Concern     Not on file   Social History Narrative     Not on file     Family History   Problem Relation Age of Onset     No Known Problems Mother     No Known Problems Father     Heart Disease Brother     Heart Disease Maternal Grandfather     SOCIAL HISTORY:  Social History     Socioeconomic History     Marital status:    Tobacco Use     Smoking status: Never Smoker     Smokeless tobacco: Former User     Types: Chew     Quit date: 01/2022   Substance and Sexual Activity      "Alcohol use: Not Currently     Drug use: Not Currently     Types: \"Crack\" cocaine, Methamphetamines     Comment: Patient quit 12/2021     CURRENT MEDICATIONS:  Current Outpatient Medications   Medication     acetaminophen (TYLENOL) 500 MG tablet     apixaban ANTICOAGULANT (ELIQUIS) 5 MG tablet     aspirin (ASA) 81 MG EC tablet     furosemide (LASIX) 80 MG tablet     levothyroxine (SYNTHROID/LEVOTHROID) 150 MCG tablet     magnesium oxide (MAG-OX) 400 (241.3 Mg) MG tablet     metolazone (ZAROXOLYN) 2.5 MG tablet     milrinone (PRIMACOR) infusion 200 mcg/mL PREMIX     omeprazole (PRILOSEC) 40 MG DR capsule     potassium chloride ER (KLOR-CON M) 20 MEQ CR tablet     sacubitril-valsartan (ENTRESTO) 49-51 MG per tablet     triamcinolone (NASACORT) 55 MCG/ACT nasal aerosol     No current facility-administered medications for this visit.     ROS:   Constitutional: No fever, chills, or sweats.  ENT: No visual disturbance, ear ache, epistaxis, sore throat.   Allergies/Immunologic: Negative.   Respiratory: No cough, hemoptysis.   Cardiovascular: As per HPI.   GI: No nausea, vomiting, hematemesis, melena, or hematochezia.   : No urinary frequency, dysuria, or hematuria.   Integument: Negative.   Psychiatric: Pleasant, no major depression noted  Neuro: No focal neurological deficits noted  Endocrinology: Negative.   Musculoskeletal: As per HPI.      EXAM:  Blood pressure today is 100/78 mmHg heart rate is 80 bpm.  His temperature is 97.4 and his weight is 205 pounds.  General: appears comfortable, alert and oriented  Head: normocephalic, atraumatic  Eyes: anicteric sclera, EOMI , PERRL  Neck: no adenopathy  Orophyarynx: moist mucosa, no lesions noted  Heart: regular, S1/S2, no murmurs, rubs or gallop. Estimated JVP at 5 cmH2O  Lungs: CTAB, No wheezing.   Abdomen: soft, non-tender, bowel sounds present, no hepatosplenomegaly  Extremities: No LE edema today  Skin: no open lesions noted  Neuro: grossly non-focal     Labs:  Lab " Results   Component Value Date    WBC 4.7 2022    HGB 14.5 2022    HCT 45.1 2022     2022     2022    POTASSIUM 3.6 2022    CHLORIDE 109 2022    CO2 26 2022    BUN 13 2022    CR 0.96 2022    GLC 98 2022    NTBNP 847 2022    AST 24 2022    ALT 24 2022    ALKPHOS 81 2022    BILITOTAL 0.3 2022    INR 1.06 2022     Labs:  Reviewed also new ones pending today     Echo 2019: LVEF 25-30%; LVED 5.4 cm; 2+ MR  Echo 2019: LVEF 30-35%; LVEDd 5.8 cm; 2+ MR  Echo 2021: LVEF 30%, moderate MR  Echo 3/1/2021: LVEF 10-15%; LVEDd 6.2 cm; moderate MR   Left Ventricle: Severely reduced left ventricular systolic function.  The EF is visually estimated to be 10-15%.     Left Ventricle: There is mild concentric hypertrophy.     Right Ventricle: The right ventricle is mildly dilated.     Mitral Valve: There is moderate regurgitation.     Tricuspid Valve: There is mild regurgitation. RVSP measurin.8      Coronary angiogram 2019:  Minimal non-obstructive coronary artery disease  Non-ischemic cardiomyopathy     TTE 22:     Left Ventricle: The left ventricle is mildly dilated. Wall thickness is   normal. Severely reduced left ventricular systolic function. The EF is   visually estimated to be 10-15%.      Right Ventricle: The right ventricle is moderately dilated. Systolic   function is moderate-to-severely reduced.      Mitral Valve: There is moderate regurgitation.      IVC/SVC: Dilated IVC with minimal respirophasic changes.      ASSESSMENT AND PLAN:  In summary, patient is a 52 year old male with above past medical history including NICM most likely related to ongoing metamphetamine use as well as history of medication non adherence who was referred to the HF clinc for further evaluation and seeing him in follow up today.  He tells me that he has not been using any drugs since 2021 and he  has been very compliant with his medications.  His Entresto was recently uptitrated to 49 mg twice daily dosing with successfully and his blood pressure is acceptable.  He tolerated these changes well and his creatinine and potassium remain stable.  Subsequently after 3 times ultimately his PICC line was removed as it came out several times and he actually think coming up until 24 hours plus after the events and a centimeter night that was already off.  The decision was made, appropriately, that this is not sustainable in the long run given that his PICC line comes out all the time.  He actually did relatively well after birth and he is feeling relatively okay.  His ejection fraction remains stable and endorgan function is pretty much unchanged at this time.  Given previous hemodynamics and how he was doing I do think he would still very strongly benefit from an LVAD especially given his improved life expectancy with the device.  I am a little bit surprised that he is doing so well without the milrinone however I do think in the long run he would still benefit from mechanical circulatory support.  He is not a transplant candidate at this time given his significant drug use as well as his positive for cotinine.  Nevertheless we will test him today in addition to endorgan function testing.  He will need colonoscopy scheduled for Monday and we did he did complete his dental testing which I believe has been cleared.  We are still working with [employer] for insurance but I think we have a process in place by now.  Wants a colonoscopy is completed I will talk with our team again to ensure that we still believe he could be an LVAD candidate.  If that is the case we will bring him down for right heart catheterization for hemodynamic optimization and consider LVAD placement the same admission.  This is of course of pending on insurance approval.  This was discussed with patient and he is in agreement.  Discussed  extensively the importance of medication adherence in addition to staying off of alcohol tobacco and any sort of drugs including methamphetamines.  He verbalized understanding of this.  I have discussed with our team in [place] and we will follow-up on the colonoscopy and then schedule the above.  I will also discuss with Dr. Henley.     I appreciate the opportunity to participate in the care of Nicolas Bustos . Please do not hesitate to contact me with any further questions.     Sincerely,   Renzo Sahni MD     [facility] Division of Cardiology       Please do not hesitate to contact me if you have any questions/concerns.     Sincerely,     Renzo Sahni MD

## 2022-08-10 NOTE — PATIENT INSTRUCTIONS
You were seen today by [facility] Advanced Heart Failure Cardiologist, Dr. Sahni, in partnership with [facility] in [place], [place]       Medication or Plan of Care changes:        Follow up:          Questions:    For concerns or questions regarding your heart care please contact your [facility] Team at [phone]. If there are any questions specifically about this visit please call [employer] at  [phone].    Follow the American Heart Association Diet and Lifestyle recommendations:    Limit saturated fat, trans fat, sodium, red meat, sweets and sugar-sweetened beverages.   If you choose to eat red meat, compare labels and select the leanest cuts available.  Aim for at least 150 minutes of moderate physical activity or 75 minutes of vigorous physical activity - or an equal combination of both - each week.

## 2022-08-10 NOTE — LETTER
Date:August 11, 2022      Patient was self referred, no letter generated. Do not send.        [facility] Health Information

## 2022-08-10 NOTE — NURSING NOTE
Chief Complaint   Patient presents with     Follow Up     Heart Failure     Medications reviewed. Patient used the [facility] pharmacy in [facility] but unable to find this in Epic.     Vitals to be taken by Lei BAH.

## 2022-08-10 NOTE — PROGRESS NOTES
August 10, 2022     I had the pleasure of seeing Nicolas Bustos in clinic today. As you know he is a 51yr old male with of HFrEF, NICM, mild non-obstructive CAD and hypothyroidism. He was initially diagnosed with HF in 11/2019. EF at that time was 25-30% and angiogram showed minimal, non-obstructive CAD. EF remained low at 30%. ICD was apparently declined by patient at the time. Then he was admitted in 02/2021 with HF exacerbation secondary to non-compliance and being off medications for the previous 6 months. Echo showed LVEF of 30% and he was diuresed from 216 lbs to 206 lbs. Then he was readmitted 2/28-3/2/2021 with HF exacerbation. Was again diuresed from a weight of 215 lbs down to 203 lbs at the time of discharge. Repeat echo at he time showed further decrement in EF down to 10-15%. HF medications were restarted. Of note, UDS was positive for methamphetamines. He was seen in clinic recently and noted advanced Class III symptoms at the time in addition to bloating, fatigue and VEGAS to 1/2 block at the time. Weight was up to 214 lbs. He was scheduled to undergo ICD implantation on 4/13/21 yet this was postponed due to having been diagnosed with PNA. I saw him once back in 4/2021.   He did have several hospital admissions, most recently on 1/31/2021 for n/v and increased sob. Wt at home 202-203 lb. BNP 2763 on admission.  Where he was initially discharged on 0.125 of milrinone he had significant symptoms of the way home came back and milrinone was increased to 0.5 mcg/kg/min. 2/1/2022 echo showed decrement in LVEF to 10-15%, RV moderately dilated, mod MR, LVEDd 6 cm. Underwent a RHC 2/1/2022 showed Adolfo CI 1.7, PW 26. Complicated by atrial tachycardia during procedure with rates up to 130s thus started on digoxin load. Diuresed 5.4 L during admission with d/c wt 198 lb. Continued milrinone at 0.5 mcg/kg/min and lasix 40 mg twice daily at discharge.   He did.  We will evaluation for LVAD which he essentially  completed however his drug test was positive For methamphetamines at that time.  Please note that he did have repeat test here that were actually negative.  He did see Dr. Henley recently in heart failure clinic where he reported that the PICC line essentially came out the day prior to his visit he noted that he did not feel any different after the inotrope was stopped for about 24 hours.  He also noted that he could not get the colonoscopy done as part of the blood work-up.  Again reiterated that he knows how sick he is however he felt better after the inotrope was stopped.  Echo was repeated on 8/1/2022 which actually showed an ejection fraction of 25% with symptoms have been a little bit better than 15% with the inotrope.  Today he is here for follow-up.  Please note that we have been trying to recently obtain insurance approval for potential LVAD implantation however the [facility] did has not approved this yet.  We have discussed this with patient's primary care provider and referrals are elevated.  Also he needs a dental and a colonoscopy done.  Note positive drug test during our screening. He was also positive for cotinine recently in [place].   We will do changes as above including stepping milrinone as the PICC line came out he actually feels okay.  He notes that he does not have any shortness of breath no dizziness lightheadedness no chest pain no palpitations.  He feels that he is doing just about the same as he did with the milrinone support.  Otherwise denies any complaints no new issues.     Past Medical History:     CHF (congestive heart failure) ([facility]) 2019     Coronary artery disease     Hashimoto's disease     Hypothyroidism     Social History   Socioeconomic History     Marital status:    Spouse name: Quita Bustos     Number of children: Not on file     Years of education: Not on file     Highest education level: Not on file   Occupational History     Not on file   Social  "Needs     Financial resource strain: Not on file     Food insecurity   Worry: Not on file   Inability: Not on file     Transportation needs   Medical: Not on file   Non-medical: Not on file   Tobacco Use     Smoking status: Never Smoker     Smokeless tobacco: Current User   Types: Chew   Substance and Sexual Activity     Alcohol use: Yes   Frequency: Monthly or less   Drinks per session: 1 or 2   Binge frequency: Less than monthly     Drug use: Never     Sexual activity: Yes   Partners: Female   Lifestyle     Physical activity   Days per week: Not on file   Minutes per session: Not on file     Stress: Not on file   Relationships     Social connections   Talks on phone: Not on file   Gets together: Not on file   Attends Christian service: Not on file   Active member of club or organization: Not on file   Attends meetings of clubs or organizations: Not on file   Relationship status: Not on file     Intimate partner violence   Fear of current or ex partner: Not on file   Emotionally abused: Not on file   Physically abused: Not on file   Forced sexual activity: Not on file   Other Topics Concern     Not on file   Social History Narrative     Not on file     Family History   Problem Relation Age of Onset     No Known Problems Mother     No Known Problems Father     Heart Disease Brother     Heart Disease Maternal Grandfather     SOCIAL HISTORY:  Social History     Socioeconomic History     Marital status:    Tobacco Use     Smoking status: Never Smoker     Smokeless tobacco: Former User     Types: Chew     Quit date: 01/2022   Substance and Sexual Activity     Alcohol use: Not Currently     Drug use: Not Currently     Types: \"Crack\" cocaine, Methamphetamines     Comment: Patient quit 12/2021     CURRENT MEDICATIONS:  Current Outpatient Medications   Medication     acetaminophen (TYLENOL) 500 MG tablet     apixaban ANTICOAGULANT (ELIQUIS) 5 MG tablet     aspirin (ASA) 81 MG EC tablet     furosemide (LASIX) 80 MG " tablet     levothyroxine (SYNTHROID/LEVOTHROID) 150 MCG tablet     magnesium oxide (MAG-OX) 400 (241.3 Mg) MG tablet     metolazone (ZAROXOLYN) 2.5 MG tablet     milrinone (PRIMACOR) infusion 200 mcg/mL PREMIX     omeprazole (PRILOSEC) 40 MG DR capsule     potassium chloride ER (KLOR-CON M) 20 MEQ CR tablet     sacubitril-valsartan (ENTRESTO) 49-51 MG per tablet     triamcinolone (NASACORT) 55 MCG/ACT nasal aerosol     No current facility-administered medications for this visit.     ROS:   Constitutional: No fever, chills, or sweats.  ENT: No visual disturbance, ear ache, epistaxis, sore throat.   Allergies/Immunologic: Negative.   Respiratory: No cough, hemoptysis.   Cardiovascular: As per HPI.   GI: No nausea, vomiting, hematemesis, melena, or hematochezia.   : No urinary frequency, dysuria, or hematuria.   Integument: Negative.   Psychiatric: Pleasant, no major depression noted  Neuro: No focal neurological deficits noted  Endocrinology: Negative.   Musculoskeletal: As per HPI.      EXAM:  Blood pressure today is 100/78 mmHg heart rate is 80 bpm.  His temperature is 97.4 and his weight is 205 pounds.  General: appears comfortable, alert and oriented  Head: normocephalic, atraumatic  Eyes: anicteric sclera, EOMI , PERRL  Neck: no adenopathy  Orophyarynx: moist mucosa, no lesions noted  Heart: regular, S1/S2, no murmurs, rubs or gallop. Estimated JVP at 5 cmH2O  Lungs: CTAB, No wheezing.   Abdomen: soft, non-tender, bowel sounds present, no hepatosplenomegaly  Extremities: No LE edema today  Skin: no open lesions noted  Neuro: grossly non-focal     Labs:  Lab Results   Component Value Date    WBC 4.7 05/17/2022    HGB 14.5 05/17/2022    HCT 45.1 05/17/2022     05/17/2022     05/17/2022    POTASSIUM 3.6 05/17/2022    CHLORIDE 109 05/17/2022    CO2 26 05/17/2022    BUN 13 05/17/2022    CR 0.96 05/17/2022    GLC 98 05/17/2022    NTBNP 847 05/17/2022    AST 24 05/17/2022    ALT 24 05/17/2022    ALKPHOS  81 2022    BILITOTAL 0.3 2022    INR 1.06 2022     Labs:  Reviewed also new ones pending today     Echo 2019: LVEF 25-30%; LVED 5.4 cm; 2+ MR  Echo 2019: LVEF 30-35%; LVEDd 5.8 cm; 2+ MR  Echo 2021: LVEF 30%, moderate MR  Echo 3/1/2021: LVEF 10-15%; LVEDd 6.2 cm; moderate MR   Left Ventricle: Severely reduced left ventricular systolic function.  The EF is visually estimated to be 10-15%.     Left Ventricle: There is mild concentric hypertrophy.     Right Ventricle: The right ventricle is mildly dilated.     Mitral Valve: There is moderate regurgitation.     Tricuspid Valve: There is mild regurgitation. RVSP measurin.8      Coronary angiogram 2019:  Minimal non-obstructive coronary artery disease  Non-ischemic cardiomyopathy     TTE 22:     Left Ventricle: The left ventricle is mildly dilated. Wall thickness is   normal. Severely reduced left ventricular systolic function. The EF is   visually estimated to be 10-15%.      Right Ventricle: The right ventricle is moderately dilated. Systolic   function is moderate-to-severely reduced.      Mitral Valve: There is moderate regurgitation.      IVC/SVC: Dilated IVC with minimal respirophasic changes.      ASSESSMENT AND PLAN:  In summary, patient is a 52 year old male with above past medical history including NICM most likely related to ongoing metamphetamine use as well as history of medication non adherence who was referred to the  clinc for further evaluation and seeing him in follow up today.  He tells me that he has not been using any drugs since 2021 and he has been very compliant with his medications.  His Entresto was recently uptitrated to 49 mg twice daily dosing with successfully and his blood pressure is acceptable.  He tolerated these changes well and his creatinine and potassium remain stable.  Subsequently after 3 times ultimately his PICC line was removed as it came out several times and he actually  think coming up until 24 hours plus after the events and a centimeter night that was already off.  The decision was made, appropriately, that this is not sustainable in the long run given that his PICC line comes out all the time.  He actually did relatively well after birth and he is feeling relatively okay.  His ejection fraction remains stable and endorgan function is pretty much unchanged at this time.  Given previous hemodynamics and how he was doing I do think he would still very strongly benefit from an LVAD especially given his improved life expectancy with the device.  I am a little bit surprised that he is doing so well without the milrinone however I do think in the long run he would still benefit from mechanical circulatory support.  He is not a transplant candidate at this time given his significant drug use as well as his positive for cotinine.  Nevertheless we will test him today in addition to endorgan function testing.  He will need colonoscopy scheduled for Monday and we did he did complete his dental testing which I believe has been cleared.  We are still working with  health [employer] for insurance but I think we have a process in place by now.  Wants a colonoscopy is completed I will talk with our team again to ensure that we still believe he could be an LVAD candidate.  If that is the case we will bring him down for right heart catheterization for hemodynamic optimization and consider LVAD placement the same admission.  This is of course of pending on insurance approval.  This was discussed with patient and he is in agreement.  Discussed extensively the importance of medication adherence in addition to staying off of alcohol tobacco and any sort of drugs including methamphetamines.  He verbalized understanding of this.  I have discussed with our team in [place] and we will follow-up on the colonoscopy and then schedule the above.  I will also discuss with Dr. Henley.     I appreciate  the opportunity to participate in the care of Nicolaslin Brownette . Please do not hesitate to contact me with any further questions.     Sincerely,   Renzo Sahni MD     [facility] Division of Cardiology

## 2022-08-25 ENCOUNTER — CARE COORDINATION (OUTPATIENT)
Dept: CARDIOLOGY | Facility: CLINIC | Age: 52
End: 2022-08-25

## 2022-08-25 ENCOUNTER — HOSPITAL ENCOUNTER (OUTPATIENT)
Facility: CLINIC | Age: 52
End: 2022-08-25
Attending: INTERNAL MEDICINE | Admitting: INTERNAL MEDICINE
Payer: COMMERCIAL

## 2022-08-25 DIAGNOSIS — I50.22 CHRONIC SYSTOLIC CONGESTIVE HEART FAILURE (H): ICD-10-CM

## 2022-08-25 DIAGNOSIS — I50.22 CHRONIC SYSTOLIC CONGESTIVE HEART FAILURE (H): Primary | ICD-10-CM

## 2022-08-25 RX ORDER — LIDOCAINE 40 MG/G
CREAM TOPICAL
Status: CANCELLED | OUTPATIENT
Start: 2022-08-25

## 2022-08-25 NOTE — PROGRESS NOTES
D/I:  Called pt to follow up regarding his LVAD evaluation. Pt has completed all testing required.  Discussed plan for repeat RHC and short admission to fully evaluate cardiac function to complete evaluation.  Discussed that pt will need ongoing substance abuse monitoring in preparation for advanced therapies.  RHC and admission ordered.  Scheduling to call pt to arrange.  A:  LVAD evaluation  P:  Pt verbalized understanding of the instructions given.  Will call VAD coordinator with further needs and questions.

## 2022-08-30 ENCOUNTER — CARE COORDINATION (OUTPATIENT)
Dept: CARDIOLOGY | Facility: CLINIC | Age: 52
End: 2022-08-30

## 2022-08-30 NOTE — PROGRESS NOTES
Called pt to discuss plan for September appt's and to verify plan to obtain insurance.  Pt informed me that he will be going to [facility] tomorrow to rectify his insurance problems.  Pt to call writer once proper insurance obtained.  Will keep 9/23 appts scheduled for now.

## 2022-09-07 ENCOUNTER — CARE COORDINATION (OUTPATIENT)
Dept: CARDIOLOGY | Facility: CLINIC | Age: 52
End: 2022-09-07

## 2022-09-07 NOTE — PROGRESS NOTES
Called pt to see if he has fixed the issues with his insurance.  Pt did not answer.  Left message requesting return call.  Will leave 9/23 RHC scheduled and ordered for now.  Awaiting return call from patient.

## 2022-09-20 ENCOUNTER — CARE COORDINATION (OUTPATIENT)
Dept: CARDIOLOGY | Facility: CLINIC | Age: 52
End: 2022-09-20

## 2022-09-20 DIAGNOSIS — I50.22 CHRONIC SYSTOLIC CONGESTIVE HEART FAILURE (H): Primary | ICD-10-CM

## 2022-09-20 DIAGNOSIS — I50.84 ACC/AHA STAGE D HEART FAILURE (H): ICD-10-CM

## 2022-09-20 DIAGNOSIS — I50.20 HEART FAILURE WITH REDUCED EJECTION FRACTION, NYHA CLASS III (H): ICD-10-CM

## 2022-09-20 NOTE — PROGRESS NOTES
Called pt to discuss pending right heart cath.    Verified that we have not rcvd insurance clearance for procedure.  Advised pt that testing will be cancelled at this time and rescheduled once insurance has been verified.    Pt given contact information for Emili YENI with finance.  Pt to contact her once issues resolved and testing will be arranged at that time.  Pt verbalized understanding of the above plan.

## 2022-10-02 ENCOUNTER — HEALTH MAINTENANCE LETTER (OUTPATIENT)
Age: 52
End: 2022-10-02

## 2022-10-12 ENCOUNTER — PATIENT OUTREACH (OUTPATIENT)
Dept: CARDIOLOGY | Facility: CLINIC | Age: 52
End: 2022-10-12

## 2022-10-12 NOTE — TELEPHONE ENCOUNTER
Dr. Sahni is recommending that patient have surveillance testing done locally. Called and spoke with patient. He will go to [facility] for the labs. Called and spoke with lab and lab orders faxed to them. Patient was asked to have testing done today or tomorrow. Patient is agreeable to plan.

## 2022-10-26 ENCOUNTER — TRANSFERRED RECORDS (OUTPATIENT)
Dept: HEALTH INFORMATION MANAGEMENT | Facility: CLINIC | Age: 52
End: 2022-10-26

## 2022-11-28 ENCOUNTER — CARE COORDINATION (OUTPATIENT)
Dept: CARDIOLOGY | Facility: CLINIC | Age: 52
End: 2022-11-28

## 2022-11-28 DIAGNOSIS — I50.22 CHRONIC SYSTOLIC CONGESTIVE HEART FAILURE (H): Primary | ICD-10-CM

## 2022-11-28 DIAGNOSIS — Z79.899 OTHER LONG TERM (CURRENT) DRUG THERAPY: ICD-10-CM

## 2022-11-28 DIAGNOSIS — I50.20 HEART FAILURE WITH REDUCED EJECTION FRACTION, NYHA CLASS III (H): ICD-10-CM

## 2022-11-28 NOTE — PROGRESS NOTES
Patient is due for his monthly surveillance labs. Orders faxed to [facility] lab and patient's wife notified via phone call. Patient's phone is not working these days.        12/6/22--No results arrived, lab called. Patient has not come in for his surveillance lab testing. This is considered a positive result. Provider notified.

## 2023-06-04 ENCOUNTER — HEALTH MAINTENANCE LETTER (OUTPATIENT)
Age: 53
End: 2023-06-04

## 2024-07-27 ENCOUNTER — HEALTH MAINTENANCE LETTER (OUTPATIENT)
Age: 54
End: 2024-07-27

## 2025-08-10 ENCOUNTER — HEALTH MAINTENANCE LETTER (OUTPATIENT)
Age: 55
End: 2025-08-10